# Patient Record
Sex: MALE | Race: WHITE | NOT HISPANIC OR LATINO | Employment: OTHER | URBAN - METROPOLITAN AREA
[De-identification: names, ages, dates, MRNs, and addresses within clinical notes are randomized per-mention and may not be internally consistent; named-entity substitution may affect disease eponyms.]

---

## 2020-05-19 ENCOUNTER — PATIENT OUTREACH (OUTPATIENT)
Dept: SCHEDULING | Facility: IMAGING CENTER | Age: 80
End: 2020-05-19

## 2021-06-09 ENCOUNTER — PATIENT OUTREACH (OUTPATIENT)
Dept: HEALTH INFORMATION MANAGEMENT | Facility: OTHER | Age: 81
End: 2021-06-09

## 2022-07-06 ENCOUNTER — TELEPHONE (OUTPATIENT)
Dept: HEALTH INFORMATION MANAGEMENT | Facility: OTHER | Age: 82
End: 2022-07-06

## 2024-01-18 ENCOUNTER — APPOINTMENT (OUTPATIENT)
Dept: RADIOLOGY | Facility: MEDICAL CENTER | Age: 84
DRG: 482 | End: 2024-01-18
Attending: EMERGENCY MEDICINE
Payer: MEDICARE

## 2024-01-18 ENCOUNTER — HOSPITAL ENCOUNTER (INPATIENT)
Facility: MEDICAL CENTER | Age: 84
LOS: 2 days | DRG: 482 | End: 2024-01-20
Attending: EMERGENCY MEDICINE | Admitting: HOSPITALIST
Payer: MEDICARE

## 2024-01-18 DIAGNOSIS — S72.002A CLOSED FRACTURE OF NECK OF LEFT FEMUR, INITIAL ENCOUNTER (HCC): ICD-10-CM

## 2024-01-18 DIAGNOSIS — R03.0 ELEVATED BLOOD PRESSURE READING: ICD-10-CM

## 2024-01-18 PROBLEM — N40.0 PROSTATIC HYPERPLASIA: Status: ACTIVE | Noted: 2024-01-18

## 2024-01-18 PROBLEM — T14.8XXA FRACTURE: Status: ACTIVE | Noted: 2024-01-18

## 2024-01-18 LAB
ANION GAP SERPL CALC-SCNC: 12 MMOL/L (ref 7–16)
APTT PPP: 29.1 SEC (ref 24.7–36)
BASOPHILS # BLD AUTO: 0.5 % (ref 0–1.8)
BASOPHILS # BLD: 0.05 K/UL (ref 0–0.12)
BUN SERPL-MCNC: 24 MG/DL (ref 8–22)
CALCIUM SERPL-MCNC: 10.2 MG/DL (ref 8.4–10.2)
CHLORIDE SERPL-SCNC: 104 MMOL/L (ref 96–112)
CO2 SERPL-SCNC: 24 MMOL/L (ref 20–33)
CREAT SERPL-MCNC: 1 MG/DL (ref 0.5–1.4)
EOSINOPHIL # BLD AUTO: 0.22 K/UL (ref 0–0.51)
EOSINOPHIL NFR BLD: 2 % (ref 0–6.9)
ERYTHROCYTE [DISTWIDTH] IN BLOOD BY AUTOMATED COUNT: 42.5 FL (ref 35.9–50)
GFR SERPLBLD CREATININE-BSD FMLA CKD-EPI: 75 ML/MIN/1.73 M 2
GLUCOSE SERPL-MCNC: 101 MG/DL (ref 65–99)
HCT VFR BLD AUTO: 42.2 % (ref 42–52)
HGB BLD-MCNC: 14.4 G/DL (ref 14–18)
IMM GRANULOCYTES # BLD AUTO: 0.05 K/UL (ref 0–0.11)
IMM GRANULOCYTES NFR BLD AUTO: 0.5 % (ref 0–0.9)
INR PPP: 0.98 (ref 0.87–1.13)
LYMPHOCYTES # BLD AUTO: 1.52 K/UL (ref 1–4.8)
LYMPHOCYTES NFR BLD: 14 % (ref 22–41)
MCH RBC QN AUTO: 30.3 PG (ref 27–33)
MCHC RBC AUTO-ENTMCNC: 34.1 G/DL (ref 32.3–36.5)
MCV RBC AUTO: 88.7 FL (ref 81.4–97.8)
MONOCYTES # BLD AUTO: 0.86 K/UL (ref 0–0.85)
MONOCYTES NFR BLD AUTO: 7.9 % (ref 0–13.4)
NEUTROPHILS # BLD AUTO: 8.13 K/UL (ref 1.82–7.42)
NEUTROPHILS NFR BLD: 75.1 % (ref 44–72)
NRBC # BLD AUTO: 0 K/UL
NRBC BLD-RTO: 0 /100 WBC (ref 0–0.2)
PLATELET # BLD AUTO: 247 K/UL (ref 164–446)
PMV BLD AUTO: 11.4 FL (ref 9–12.9)
POTASSIUM SERPL-SCNC: 3.7 MMOL/L (ref 3.6–5.5)
PROTHROMBIN TIME: 13.4 SEC (ref 12–14.6)
RBC # BLD AUTO: 4.76 M/UL (ref 4.7–6.1)
SODIUM SERPL-SCNC: 140 MMOL/L (ref 135–145)
WBC # BLD AUTO: 10.8 K/UL (ref 4.8–10.8)

## 2024-01-18 PROCEDURE — 71045 X-RAY EXAM CHEST 1 VIEW: CPT

## 2024-01-18 PROCEDURE — 36415 COLL VENOUS BLD VENIPUNCTURE: CPT

## 2024-01-18 PROCEDURE — 99222 1ST HOSP IP/OBS MODERATE 55: CPT | Mod: AI | Performed by: HOSPITALIST

## 2024-01-18 PROCEDURE — 700111 HCHG RX REV CODE 636 W/ 250 OVERRIDE (IP): Mod: JZ | Performed by: EMERGENCY MEDICINE

## 2024-01-18 PROCEDURE — 94760 N-INVAS EAR/PLS OXIMETRY 1: CPT

## 2024-01-18 PROCEDURE — 96375 TX/PRO/DX INJ NEW DRUG ADDON: CPT

## 2024-01-18 PROCEDURE — 99285 EMERGENCY DEPT VISIT HI MDM: CPT

## 2024-01-18 PROCEDURE — 85610 PROTHROMBIN TIME: CPT

## 2024-01-18 PROCEDURE — 80048 BASIC METABOLIC PNL TOTAL CA: CPT

## 2024-01-18 PROCEDURE — 96374 THER/PROPH/DIAG INJ IV PUSH: CPT

## 2024-01-18 PROCEDURE — 770006 HCHG ROOM/CARE - MED/SURG/GYN SEMI*

## 2024-01-18 PROCEDURE — 700105 HCHG RX REV CODE 258: Performed by: HOSPITALIST

## 2024-01-18 PROCEDURE — 85025 COMPLETE CBC W/AUTO DIFF WBC: CPT

## 2024-01-18 PROCEDURE — 85730 THROMBOPLASTIN TIME PARTIAL: CPT

## 2024-01-18 RX ORDER — ONDANSETRON 2 MG/ML
4 INJECTION INTRAMUSCULAR; INTRAVENOUS EVERY 4 HOURS PRN
Status: DISCONTINUED | OUTPATIENT
Start: 2024-01-18 | End: 2024-01-20 | Stop reason: HOSPADM

## 2024-01-18 RX ORDER — AMOXICILLIN 250 MG
2 CAPSULE ORAL 2 TIMES DAILY
Status: DISCONTINUED | OUTPATIENT
Start: 2024-01-18 | End: 2024-01-20 | Stop reason: HOSPADM

## 2024-01-18 RX ORDER — LABETALOL HYDROCHLORIDE 5 MG/ML
10 INJECTION, SOLUTION INTRAVENOUS EVERY 4 HOURS PRN
Status: DISCONTINUED | OUTPATIENT
Start: 2024-01-18 | End: 2024-01-20 | Stop reason: HOSPADM

## 2024-01-18 RX ORDER — POLYETHYLENE GLYCOL 3350 17 G/17G
1 POWDER, FOR SOLUTION ORAL
Status: DISCONTINUED | OUTPATIENT
Start: 2024-01-18 | End: 2024-01-20 | Stop reason: HOSPADM

## 2024-01-18 RX ORDER — FINASTERIDE 5 MG/1
5 TABLET, FILM COATED ORAL DAILY
Status: DISCONTINUED | OUTPATIENT
Start: 2024-01-19 | End: 2024-01-20 | Stop reason: HOSPADM

## 2024-01-18 RX ORDER — BISACODYL 10 MG
10 SUPPOSITORY, RECTAL RECTAL
Status: DISCONTINUED | OUTPATIENT
Start: 2024-01-18 | End: 2024-01-20 | Stop reason: HOSPADM

## 2024-01-18 RX ORDER — HYDROMORPHONE HYDROCHLORIDE 1 MG/ML
0.25 INJECTION, SOLUTION INTRAMUSCULAR; INTRAVENOUS; SUBCUTANEOUS
Status: DISCONTINUED | OUTPATIENT
Start: 2024-01-18 | End: 2024-01-20 | Stop reason: HOSPADM

## 2024-01-18 RX ORDER — OXYCODONE HYDROCHLORIDE 5 MG/1
2.5 TABLET ORAL
Status: DISCONTINUED | OUTPATIENT
Start: 2024-01-18 | End: 2024-01-20 | Stop reason: HOSPADM

## 2024-01-18 RX ORDER — MORPHINE SULFATE 4 MG/ML
4 INJECTION INTRAVENOUS ONCE
Status: COMPLETED | OUTPATIENT
Start: 2024-01-18 | End: 2024-01-18

## 2024-01-18 RX ORDER — SODIUM CHLORIDE, SODIUM LACTATE, POTASSIUM CHLORIDE, CALCIUM CHLORIDE 600; 310; 30; 20 MG/100ML; MG/100ML; MG/100ML; MG/100ML
INJECTION, SOLUTION INTRAVENOUS CONTINUOUS
Status: DISCONTINUED | OUTPATIENT
Start: 2024-01-19 | End: 2024-01-20

## 2024-01-18 RX ORDER — OXYCODONE HYDROCHLORIDE 5 MG/1
5 TABLET ORAL
Status: DISCONTINUED | OUTPATIENT
Start: 2024-01-18 | End: 2024-01-20 | Stop reason: HOSPADM

## 2024-01-18 RX ORDER — ACETAMINOPHEN 325 MG/1
650 TABLET ORAL EVERY 6 HOURS PRN
Status: DISCONTINUED | OUTPATIENT
Start: 2024-01-18 | End: 2024-01-20 | Stop reason: HOSPADM

## 2024-01-18 RX ORDER — ONDANSETRON 4 MG/1
4 TABLET, ORALLY DISINTEGRATING ORAL EVERY 4 HOURS PRN
Status: DISCONTINUED | OUTPATIENT
Start: 2024-01-18 | End: 2024-01-20 | Stop reason: HOSPADM

## 2024-01-18 RX ORDER — ONDANSETRON 2 MG/ML
4 INJECTION INTRAMUSCULAR; INTRAVENOUS ONCE
Status: COMPLETED | OUTPATIENT
Start: 2024-01-18 | End: 2024-01-18

## 2024-01-18 RX ADMIN — SODIUM CHLORIDE, POTASSIUM CHLORIDE, SODIUM LACTATE AND CALCIUM CHLORIDE: 600; 310; 30; 20 INJECTION, SOLUTION INTRAVENOUS at 23:39

## 2024-01-18 RX ADMIN — ONDANSETRON 4 MG: 2 INJECTION INTRAMUSCULAR; INTRAVENOUS at 17:55

## 2024-01-18 RX ADMIN — MORPHINE SULFATE 4 MG: 4 INJECTION, SOLUTION INTRAMUSCULAR; INTRAVENOUS at 17:55

## 2024-01-18 ASSESSMENT — PATIENT HEALTH QUESTIONNAIRE - PHQ9
SUM OF ALL RESPONSES TO PHQ9 QUESTIONS 1 AND 2: 0
1. LITTLE INTEREST OR PLEASURE IN DOING THINGS: NOT AT ALL
2. FEELING DOWN, DEPRESSED, IRRITABLE, OR HOPELESS: NOT AT ALL

## 2024-01-18 ASSESSMENT — LIFESTYLE VARIABLES
ALCOHOL_USE: YES
TOTAL SCORE: 0
HAVE PEOPLE ANNOYED YOU BY CRITICIZING YOUR DRINKING: NO
HAVE YOU EVER FELT YOU SHOULD CUT DOWN ON YOUR DRINKING: NO
TOTAL SCORE: 0
AVERAGE NUMBER OF DAYS PER WEEK YOU HAVE A DRINK CONTAINING ALCOHOL: 3
CONSUMPTION TOTAL: INCOMPLETE
TOTAL SCORE: 0
EVER FELT BAD OR GUILTY ABOUT YOUR DRINKING: NO
EVER HAD A DRINK FIRST THING IN THE MORNING TO STEADY YOUR NERVES TO GET RID OF A HANGOVER: NO
HOW MANY TIMES IN THE PAST YEAR HAVE YOU HAD 5 OR MORE DRINKS IN A DAY: 0

## 2024-01-18 ASSESSMENT — COGNITIVE AND FUNCTIONAL STATUS - GENERAL
MOBILITY SCORE: 17
SUGGESTED CMS G CODE MODIFIER MOBILITY: CK
HELP NEEDED FOR BATHING: A LITTLE
WALKING IN HOSPITAL ROOM: A LITTLE
DRESSING REGULAR LOWER BODY CLOTHING: A LITTLE
SUGGESTED CMS G CODE MODIFIER DAILY ACTIVITY: CJ
DAILY ACTIVITIY SCORE: 21
DRESSING REGULAR UPPER BODY CLOTHING: A LITTLE
CLIMB 3 TO 5 STEPS WITH RAILING: A LOT
STANDING UP FROM CHAIR USING ARMS: A LITTLE
TURNING FROM BACK TO SIDE WHILE IN FLAT BAD: A LITTLE
MOVING FROM LYING ON BACK TO SITTING ON SIDE OF FLAT BED: A LITTLE
MOVING TO AND FROM BED TO CHAIR: A LITTLE

## 2024-01-18 ASSESSMENT — ENCOUNTER SYMPTOMS
STRIDOR: 0
COUGH: 0
CHILLS: 0
BRUISES/BLEEDS EASILY: 0
SHORTNESS OF BREATH: 0
EYE REDNESS: 0
EYE DISCHARGE: 0
ABDOMINAL PAIN: 0
FLANK PAIN: 0
NERVOUS/ANXIOUS: 0
FALLS: 1
FEVER: 0
MYALGIAS: 0
FOCAL WEAKNESS: 0
VOMITING: 0

## 2024-01-18 ASSESSMENT — FIBROSIS 4 INDEX
FIB4 SCORE: 1.76
FIB4 SCORE: 1.43

## 2024-01-18 ASSESSMENT — PAIN DESCRIPTION - PAIN TYPE: TYPE: ACUTE PAIN

## 2024-01-18 ASSESSMENT — PAIN DESCRIPTION - DESCRIPTORS: DESCRIPTORS: SHARP

## 2024-01-19 ENCOUNTER — ANESTHESIA (OUTPATIENT)
Dept: SURGERY | Facility: MEDICAL CENTER | Age: 84
DRG: 482 | End: 2024-01-19
Payer: MEDICARE

## 2024-01-19 ENCOUNTER — ANESTHESIA EVENT (OUTPATIENT)
Dept: SURGERY | Facility: MEDICAL CENTER | Age: 84
DRG: 482 | End: 2024-01-19
Payer: MEDICARE

## 2024-01-19 ENCOUNTER — APPOINTMENT (OUTPATIENT)
Dept: RADIOLOGY | Facility: MEDICAL CENTER | Age: 84
DRG: 482 | End: 2024-01-19
Attending: ORTHOPAEDIC SURGERY
Payer: MEDICARE

## 2024-01-19 PROBLEM — E87.6 HYPOKALEMIA: Status: ACTIVE | Noted: 2024-01-19

## 2024-01-19 LAB
ALBUMIN SERPL BCP-MCNC: 3.3 G/DL (ref 3.2–4.9)
ALBUMIN/GLOB SERPL: 1.4 G/DL
ALP SERPL-CCNC: 147 U/L (ref 30–99)
ALT SERPL-CCNC: 13 U/L (ref 2–50)
ANION GAP SERPL CALC-SCNC: 11 MMOL/L (ref 7–16)
AST SERPL-CCNC: 13 U/L (ref 12–45)
BILIRUB SERPL-MCNC: 0.2 MG/DL (ref 0.1–1.5)
BUN SERPL-MCNC: 23 MG/DL (ref 8–22)
CALCIUM ALBUM COR SERPL-MCNC: 10.2 MG/DL (ref 8.5–10.5)
CALCIUM SERPL-MCNC: 9.6 MG/DL (ref 8.4–10.2)
CHLORIDE SERPL-SCNC: 106 MMOL/L (ref 96–112)
CO2 SERPL-SCNC: 23 MMOL/L (ref 20–33)
CREAT SERPL-MCNC: 0.96 MG/DL (ref 0.5–1.4)
ERYTHROCYTE [DISTWIDTH] IN BLOOD BY AUTOMATED COUNT: 43.7 FL (ref 35.9–50)
GFR SERPLBLD CREATININE-BSD FMLA CKD-EPI: 79 ML/MIN/1.73 M 2
GLOBULIN SER CALC-MCNC: 2.4 G/DL (ref 1.9–3.5)
GLUCOSE SERPL-MCNC: 105 MG/DL (ref 65–99)
HCT VFR BLD AUTO: 40.3 % (ref 42–52)
HGB BLD-MCNC: 13.7 G/DL (ref 14–18)
MAGNESIUM SERPL-MCNC: 2.2 MG/DL (ref 1.5–2.5)
MCH RBC QN AUTO: 30.6 PG (ref 27–33)
MCHC RBC AUTO-ENTMCNC: 34 G/DL (ref 32.3–36.5)
MCV RBC AUTO: 90.2 FL (ref 81.4–97.8)
PLATELET # BLD AUTO: 231 K/UL (ref 164–446)
PMV BLD AUTO: 11.8 FL (ref 9–12.9)
POTASSIUM SERPL-SCNC: 3.5 MMOL/L (ref 3.6–5.5)
PROT SERPL-MCNC: 5.7 G/DL (ref 6–8.2)
RBC # BLD AUTO: 4.47 M/UL (ref 4.7–6.1)
SODIUM SERPL-SCNC: 140 MMOL/L (ref 135–145)
WBC # BLD AUTO: 8.3 K/UL (ref 4.8–10.8)

## 2024-01-19 PROCEDURE — 700111 HCHG RX REV CODE 636 W/ 250 OVERRIDE (IP): Performed by: STUDENT IN AN ORGANIZED HEALTH CARE EDUCATION/TRAINING PROGRAM

## 2024-01-19 PROCEDURE — 502000 HCHG MISC OR IMPLANTS RC 0278: Performed by: ORTHOPAEDIC SURGERY

## 2024-01-19 PROCEDURE — 700105 HCHG RX REV CODE 258: Performed by: HOSPITALIST

## 2024-01-19 PROCEDURE — 160009 HCHG ANES TIME/MIN: Performed by: ORTHOPAEDIC SURGERY

## 2024-01-19 PROCEDURE — 80053 COMPREHEN METABOLIC PANEL: CPT

## 2024-01-19 PROCEDURE — 73502 X-RAY EXAM HIP UNI 2-3 VIEWS: CPT | Mod: LT

## 2024-01-19 PROCEDURE — 700102 HCHG RX REV CODE 250 W/ 637 OVERRIDE(OP): Performed by: HOSPITALIST

## 2024-01-19 PROCEDURE — 99222 1ST HOSP IP/OBS MODERATE 55: CPT | Mod: 57 | Performed by: ORTHOPAEDIC SURGERY

## 2024-01-19 PROCEDURE — 83735 ASSAY OF MAGNESIUM: CPT

## 2024-01-19 PROCEDURE — 27236 TREAT THIGH FRACTURE: CPT | Mod: LT | Performed by: ORTHOPAEDIC SURGERY

## 2024-01-19 PROCEDURE — A9270 NON-COVERED ITEM OR SERVICE: HCPCS | Performed by: HOSPITALIST

## 2024-01-19 PROCEDURE — 700102 HCHG RX REV CODE 250 W/ 637 OVERRIDE(OP): Performed by: STUDENT IN AN ORGANIZED HEALTH CARE EDUCATION/TRAINING PROGRAM

## 2024-01-19 PROCEDURE — C1713 ANCHOR/SCREW BN/BN,TIS/BN: HCPCS | Performed by: ORTHOPAEDIC SURGERY

## 2024-01-19 PROCEDURE — 160041 HCHG SURGERY MINUTES - EA ADDL 1 MIN LEVEL 4: Performed by: ORTHOPAEDIC SURGERY

## 2024-01-19 PROCEDURE — 36415 COLL VENOUS BLD VENIPUNCTURE: CPT

## 2024-01-19 PROCEDURE — 770001 HCHG ROOM/CARE - MED/SURG/GYN PRIV*

## 2024-01-19 PROCEDURE — 94760 N-INVAS EAR/PLS OXIMETRY 1: CPT

## 2024-01-19 PROCEDURE — 85027 COMPLETE CBC AUTOMATED: CPT

## 2024-01-19 PROCEDURE — 700101 HCHG RX REV CODE 250: Performed by: STUDENT IN AN ORGANIZED HEALTH CARE EDUCATION/TRAINING PROGRAM

## 2024-01-19 PROCEDURE — 0QS704Z REPOSITION LEFT UPPER FEMUR WITH INTERNAL FIXATION DEVICE, OPEN APPROACH: ICD-10-PCS | Performed by: ORTHOPAEDIC SURGERY

## 2024-01-19 PROCEDURE — 700111 HCHG RX REV CODE 636 W/ 250 OVERRIDE (IP): Mod: JZ | Performed by: HOSPITALIST

## 2024-01-19 PROCEDURE — 160036 HCHG PACU - EA ADDL 30 MINS PHASE I: Performed by: ORTHOPAEDIC SURGERY

## 2024-01-19 PROCEDURE — 160035 HCHG PACU - 1ST 60 MINS PHASE I: Performed by: ORTHOPAEDIC SURGERY

## 2024-01-19 PROCEDURE — 700111 HCHG RX REV CODE 636 W/ 250 OVERRIDE (IP): Performed by: ORTHOPAEDIC SURGERY

## 2024-01-19 PROCEDURE — 160002 HCHG RECOVERY MINUTES (STAT): Performed by: ORTHOPAEDIC SURGERY

## 2024-01-19 PROCEDURE — 700105 HCHG RX REV CODE 258: Performed by: ORTHOPAEDIC SURGERY

## 2024-01-19 PROCEDURE — 160029 HCHG SURGERY MINUTES - 1ST 30 MINS LEVEL 4: Performed by: ORTHOPAEDIC SURGERY

## 2024-01-19 PROCEDURE — 160048 HCHG OR STATISTICAL LEVEL 1-5: Performed by: ORTHOPAEDIC SURGERY

## 2024-01-19 PROCEDURE — 99232 SBSQ HOSP IP/OBS MODERATE 35: CPT | Performed by: HOSPITALIST

## 2024-01-19 PROCEDURE — A9270 NON-COVERED ITEM OR SERVICE: HCPCS | Performed by: STUDENT IN AN ORGANIZED HEALTH CARE EDUCATION/TRAINING PROGRAM

## 2024-01-19 DEVICE — IMPLANTABLE DEVICE: Type: IMPLANTABLE DEVICE | Site: HIP | Status: FUNCTIONAL

## 2024-01-19 RX ORDER — ROCURONIUM BROMIDE 10 MG/ML
INJECTION, SOLUTION INTRAVENOUS PRN
Status: DISCONTINUED | OUTPATIENT
Start: 2024-01-19 | End: 2024-01-19 | Stop reason: SURG

## 2024-01-19 RX ORDER — ENOXAPARIN SODIUM 100 MG/ML
40 INJECTION SUBCUTANEOUS DAILY
Status: DISCONTINUED | OUTPATIENT
Start: 2024-01-19 | End: 2024-01-20 | Stop reason: HOSPADM

## 2024-01-19 RX ORDER — HYDROMORPHONE HYDROCHLORIDE 1 MG/ML
0.4 INJECTION, SOLUTION INTRAMUSCULAR; INTRAVENOUS; SUBCUTANEOUS
Status: DISCONTINUED | OUTPATIENT
Start: 2024-01-19 | End: 2024-01-19 | Stop reason: HOSPADM

## 2024-01-19 RX ORDER — DEXAMETHASONE SODIUM PHOSPHATE 4 MG/ML
INJECTION, SOLUTION INTRA-ARTICULAR; INTRALESIONAL; INTRAMUSCULAR; INTRAVENOUS; SOFT TISSUE PRN
Status: DISCONTINUED | OUTPATIENT
Start: 2024-01-19 | End: 2024-01-19 | Stop reason: SURG

## 2024-01-19 RX ORDER — DIPHENHYDRAMINE HYDROCHLORIDE 50 MG/ML
12.5 INJECTION INTRAMUSCULAR; INTRAVENOUS
Status: DISCONTINUED | OUTPATIENT
Start: 2024-01-19 | End: 2024-01-19 | Stop reason: HOSPADM

## 2024-01-19 RX ORDER — LIDOCAINE HYDROCHLORIDE 20 MG/ML
INJECTION, SOLUTION EPIDURAL; INFILTRATION; INTRACAUDAL; PERINEURAL PRN
Status: DISCONTINUED | OUTPATIENT
Start: 2024-01-19 | End: 2024-01-19 | Stop reason: SURG

## 2024-01-19 RX ORDER — HYDROMORPHONE HYDROCHLORIDE 1 MG/ML
0.1 INJECTION, SOLUTION INTRAMUSCULAR; INTRAVENOUS; SUBCUTANEOUS
Status: DISCONTINUED | OUTPATIENT
Start: 2024-01-19 | End: 2024-01-19 | Stop reason: HOSPADM

## 2024-01-19 RX ORDER — OXYCODONE HCL 5 MG/5 ML
10 SOLUTION, ORAL ORAL
Status: COMPLETED | OUTPATIENT
Start: 2024-01-19 | End: 2024-01-19

## 2024-01-19 RX ORDER — ONDANSETRON 2 MG/ML
4 INJECTION INTRAMUSCULAR; INTRAVENOUS
Status: COMPLETED | OUTPATIENT
Start: 2024-01-19 | End: 2024-01-19

## 2024-01-19 RX ORDER — HYDROMORPHONE HYDROCHLORIDE 2 MG/ML
INJECTION, SOLUTION INTRAMUSCULAR; INTRAVENOUS; SUBCUTANEOUS PRN
Status: DISCONTINUED | OUTPATIENT
Start: 2024-01-19 | End: 2024-01-19 | Stop reason: SURG

## 2024-01-19 RX ORDER — SODIUM CHLORIDE, SODIUM LACTATE, POTASSIUM CHLORIDE, CALCIUM CHLORIDE 600; 310; 30; 20 MG/100ML; MG/100ML; MG/100ML; MG/100ML
INJECTION, SOLUTION INTRAVENOUS CONTINUOUS
Status: DISCONTINUED | OUTPATIENT
Start: 2024-01-19 | End: 2024-01-19 | Stop reason: HOSPADM

## 2024-01-19 RX ORDER — TRANEXAMIC ACID 100 MG/ML
INJECTION, SOLUTION INTRAVENOUS PRN
Status: DISCONTINUED | OUTPATIENT
Start: 2024-01-19 | End: 2024-01-19 | Stop reason: SURG

## 2024-01-19 RX ORDER — MEPERIDINE HYDROCHLORIDE 25 MG/ML
12.5 INJECTION INTRAMUSCULAR; INTRAVENOUS; SUBCUTANEOUS
Status: DISCONTINUED | OUTPATIENT
Start: 2024-01-19 | End: 2024-01-19 | Stop reason: HOSPADM

## 2024-01-19 RX ORDER — CEFAZOLIN SODIUM 1 G/3ML
INJECTION, POWDER, FOR SOLUTION INTRAMUSCULAR; INTRAVENOUS PRN
Status: DISCONTINUED | OUTPATIENT
Start: 2024-01-19 | End: 2024-01-19 | Stop reason: SURG

## 2024-01-19 RX ORDER — HYDRALAZINE HYDROCHLORIDE 20 MG/ML
5 INJECTION INTRAMUSCULAR; INTRAVENOUS
Status: DISCONTINUED | OUTPATIENT
Start: 2024-01-19 | End: 2024-01-19 | Stop reason: HOSPADM

## 2024-01-19 RX ORDER — HALOPERIDOL 5 MG/ML
1 INJECTION INTRAMUSCULAR
Status: DISCONTINUED | OUTPATIENT
Start: 2024-01-19 | End: 2024-01-19 | Stop reason: HOSPADM

## 2024-01-19 RX ORDER — HYDROMORPHONE HYDROCHLORIDE 1 MG/ML
0.2 INJECTION, SOLUTION INTRAMUSCULAR; INTRAVENOUS; SUBCUTANEOUS
Status: DISCONTINUED | OUTPATIENT
Start: 2024-01-19 | End: 2024-01-19 | Stop reason: HOSPADM

## 2024-01-19 RX ORDER — LABETALOL HYDROCHLORIDE 5 MG/ML
5 INJECTION, SOLUTION INTRAVENOUS
Status: DISCONTINUED | OUTPATIENT
Start: 2024-01-19 | End: 2024-01-19 | Stop reason: HOSPADM

## 2024-01-19 RX ORDER — OXYCODONE HCL 5 MG/5 ML
5 SOLUTION, ORAL ORAL
Status: COMPLETED | OUTPATIENT
Start: 2024-01-19 | End: 2024-01-19

## 2024-01-19 RX ADMIN — CEFAZOLIN 2 G: 2 INJECTION, POWDER, FOR SOLUTION INTRAMUSCULAR; INTRAVENOUS at 18:09

## 2024-01-19 RX ADMIN — FENTANYL CITRATE 25 MCG: 50 INJECTION, SOLUTION INTRAMUSCULAR; INTRAVENOUS at 11:30

## 2024-01-19 RX ADMIN — ENOXAPARIN SODIUM 40 MG: 100 INJECTION SUBCUTANEOUS at 18:08

## 2024-01-19 RX ADMIN — FENTANYL CITRATE 25 MCG: 50 INJECTION, SOLUTION INTRAMUSCULAR; INTRAVENOUS at 11:58

## 2024-01-19 RX ADMIN — CEFAZOLIN 2 G: 1 INJECTION, POWDER, FOR SOLUTION INTRAMUSCULAR; INTRAVENOUS at 09:54

## 2024-01-19 RX ADMIN — TRANEXAMIC ACID 1000 MG: 100 INJECTION, SOLUTION INTRAVENOUS at 09:58

## 2024-01-19 RX ADMIN — HYDRALAZINE HYDROCHLORIDE 5 MG: 20 INJECTION, SOLUTION INTRAMUSCULAR; INTRAVENOUS at 12:51

## 2024-01-19 RX ADMIN — PROPOFOL 150 MG: 10 INJECTION, EMULSION INTRAVENOUS at 09:52

## 2024-01-19 RX ADMIN — OXYCODONE HYDROCHLORIDE 5 MG: 5 TABLET ORAL at 21:08

## 2024-01-19 RX ADMIN — SODIUM CHLORIDE, POTASSIUM CHLORIDE, SODIUM LACTATE AND CALCIUM CHLORIDE: 600; 310; 30; 20 INJECTION, SOLUTION INTRAVENOUS at 18:09

## 2024-01-19 RX ADMIN — FENTANYL CITRATE 25 MCG: 50 INJECTION, SOLUTION INTRAMUSCULAR; INTRAVENOUS at 11:18

## 2024-01-19 RX ADMIN — ONDANSETRON 4 MG: 2 INJECTION INTRAMUSCULAR; INTRAVENOUS at 11:10

## 2024-01-19 RX ADMIN — CEFAZOLIN 2 G: 1 INJECTION, POWDER, FOR SOLUTION INTRAMUSCULAR; INTRAVENOUS at 10:42

## 2024-01-19 RX ADMIN — FENTANYL CITRATE 25 MCG: 50 INJECTION, SOLUTION INTRAMUSCULAR; INTRAVENOUS at 11:40

## 2024-01-19 RX ADMIN — OXYCODONE HYDROCHLORIDE 5 MG: 5 SOLUTION ORAL at 12:00

## 2024-01-19 RX ADMIN — DEXAMETHASONE SODIUM PHOSPHATE 4 MG: 4 INJECTION INTRA-ARTICULAR; INTRALESIONAL; INTRAMUSCULAR; INTRAVENOUS; SOFT TISSUE at 09:58

## 2024-01-19 RX ADMIN — FENTANYL CITRATE 50 MCG: 50 INJECTION, SOLUTION INTRAMUSCULAR; INTRAVENOUS at 12:12

## 2024-01-19 RX ADMIN — HYDROMORPHONE HYDROCHLORIDE 0.5 MG: 2 INJECTION INTRAMUSCULAR; INTRAVENOUS; SUBCUTANEOUS at 10:11

## 2024-01-19 RX ADMIN — ROCURONIUM BROMIDE 30 MG: 50 INJECTION, SOLUTION INTRAVENOUS at 09:56

## 2024-01-19 RX ADMIN — LIDOCAINE HYDROCHLORIDE 100 MG: 20 INJECTION, SOLUTION EPIDURAL; INFILTRATION; INTRACAUDAL at 09:52

## 2024-01-19 RX ADMIN — HYDROMORPHONE HYDROCHLORIDE 0.5 MG: 2 INJECTION INTRAMUSCULAR; INTRAVENOUS; SUBCUTANEOUS at 10:40

## 2024-01-19 RX ADMIN — LABETALOL HYDROCHLORIDE 5 MG: 5 INJECTION, SOLUTION INTRAVENOUS at 11:56

## 2024-01-19 RX ADMIN — HYDROMORPHONE HYDROCHLORIDE 0.5 MG: 2 INJECTION INTRAMUSCULAR; INTRAVENOUS; SUBCUTANEOUS at 09:52

## 2024-01-19 RX ADMIN — OXYCODONE HYDROCHLORIDE 5 MG: 5 SOLUTION ORAL at 11:18

## 2024-01-19 RX ADMIN — ROCURONIUM BROMIDE 20 MG: 50 INJECTION, SOLUTION INTRAVENOUS at 10:11

## 2024-01-19 ASSESSMENT — COGNITIVE AND FUNCTIONAL STATUS - GENERAL
DRESSING REGULAR LOWER BODY CLOTHING: A LOT
MOVING TO AND FROM BED TO CHAIR: A LOT
WALKING IN HOSPITAL ROOM: A LOT
MOVING FROM LYING ON BACK TO SITTING ON SIDE OF FLAT BED: A LITTLE
TURNING FROM BACK TO SIDE WHILE IN FLAT BAD: A LITTLE
DAILY ACTIVITIY SCORE: 20
SUGGESTED CMS G CODE MODIFIER MOBILITY: CL
SUGGESTED CMS G CODE MODIFIER DAILY ACTIVITY: CJ
STANDING UP FROM CHAIR USING ARMS: A LOT
HELP NEEDED FOR BATHING: A LOT
CLIMB 3 TO 5 STEPS WITH RAILING: A LOT
MOBILITY SCORE: 14

## 2024-01-19 ASSESSMENT — LIFESTYLE VARIABLES
TOTAL SCORE: 0
EVER FELT BAD OR GUILTY ABOUT YOUR DRINKING: NO
EVER HAD A DRINK FIRST THING IN THE MORNING TO STEADY YOUR NERVES TO GET RID OF A HANGOVER: NO
AVERAGE NUMBER OF DAYS PER WEEK YOU HAVE A DRINK CONTAINING ALCOHOL: 3
ON A TYPICAL DAY WHEN YOU DRINK ALCOHOL HOW MANY DRINKS DO YOU HAVE: 0
HOW MANY TIMES IN THE PAST YEAR HAVE YOU HAD 5 OR MORE DRINKS IN A DAY: 0
TOTAL SCORE: 0
ALCOHOL_USE: YES
TOTAL SCORE: 0
HAVE PEOPLE ANNOYED YOU BY CRITICIZING YOUR DRINKING: NO
HAVE YOU EVER FELT YOU SHOULD CUT DOWN ON YOUR DRINKING: NO
CONSUMPTION TOTAL: NEGATIVE

## 2024-01-19 ASSESSMENT — PAIN DESCRIPTION - PAIN TYPE
TYPE: ACUTE PAIN
TYPE: CHRONIC PAIN
TYPE: SURGICAL PAIN
TYPE: ACUTE PAIN
TYPE: SURGICAL PAIN

## 2024-01-19 ASSESSMENT — ENCOUNTER SYMPTOMS
SPUTUM PRODUCTION: 0
HEMOPTYSIS: 0
DIZZINESS: 0
COUGH: 0
ORTHOPNEA: 0
CHILLS: 0
VOMITING: 0
NAUSEA: 0
PALPITATIONS: 0

## 2024-01-19 NOTE — CARE PLAN
The patient is Stable - Low risk of patient condition declining or worsening    Shift Goals  Clinical Goals: surgery  Patient Goals: surgery  Family Goals: support    Progress made toward(s) clinical / shift goals:  Patient had hip surgery. Aquacel dressing intact. Pain managed adequately. Pending PT/OT.   Problem: Pain - Standard  Goal: Alleviation of pain or a reduction in pain to the patient’s comfort goal  Outcome: Progressing     Problem: Risk for Bleeding  Goal: Patient will take measures to prevent bleeding and recognizes signs of bleeding that need to be reported immediately to a health care professional  Outcome: Progressing       Patient is not progressing towards the following goals:

## 2024-01-19 NOTE — ANESTHESIA PROCEDURE NOTES
Airway    Date/Time: 1/19/2024 9:54 AM    Performed by: Kai Bruner M.D.  Authorized by: Kai Bruner M.D.    Location:  OR  Urgency:  Elective  Indications for Airway Management:  Anesthesia      Spontaneous Ventilation: absent    Sedation Level:  Deep  Preoxygenated: Yes    Final Airway Type:  Supraglottic airway  Final Supraglottic Airway:  Standard LMA    SGA Size:  4  Number of Attempts at Approach:  1

## 2024-01-19 NOTE — CONSULTS
1/19/2024    HPI: Real Yadav is a 83 y.o. male who presents with left hip pain after a fall about 3 weeks ago.  He states that he was hiking around and tripped on a curb and had a mechanical fall onto his left side.  Initially went to [facility] where imaging was reportedly negative.  He has had persistent pain and inability to bear weight since then.  He ultimately returned to [facility] urgent care yesterday and MRI was obtained which demonstrated a subacute left femoral neck fracture.  He was then sent [facility] and is for direct admission and plan for surgical fixation of his fracture.  Currently complains only of left hip pain.  Denies any numbness or tingling in his foot.  He states that he has been unable to ambulate and has been getting around with crutches.    Past Medical History:   Diagnosis Date    Gout     H/O measles     Hemorrhoid     History of chickenpox     Venereal disease        Past Surgical History:   Procedure Laterality Date    VASECTOMY  1973    APPENDECTOMY  1961       Medications  No current facility-administered medications on file prior to encounter.     Current Outpatient Medications on File Prior to Encounter   Medication Sig Dispense Refill    finasteride (PROSCAR) 5 MG Tab Take 5 mg by mouth every day.         Allergies  Patient has no known allergies.    ROS  Negative except as indicated in the HPI    Family History   Problem Relation Age of Onset    Cancer Mother     Heart Disease Father     Heart Attack Father     Arthritis Brother     Lung Disease Sister     Arthritis Other     Cancer Other     Other Other         GOUT    Heart Disease Other        Social History     Socioeconomic History    Marital status:     Highest education level: Bachelor's degree (e.g., BA, AB, BS)   Tobacco Use    Smoking status: Former     Current packs/day: 0.00     Average packs/day: 1.5 packs/day for 40.0 years (60.0 ttl pk-yrs)     Types: Cigarettes     Start date: 8/4/1946      "Quit date: 1986     Years since quittin.4    Smokeless tobacco: Never   Substance and Sexual Activity    Alcohol use: Yes     Alcohol/week: 5.4 oz     Types: 7 Glasses of wine, 2 Shots of liquor per week    Drug use: No     Social Determinants of Health     Financial Resource Strain: Low Risk  (2022)    Overall Financial Resource Strain (CARDIA)     Difficulty of Paying Living Expenses: Not hard at all   Food Insecurity: No Food Insecurity (2022)    Hunger Vital Sign     Worried About Running Out of Food in the Last Year: Never true     Ran Out of Food in the Last Year: Never true   Transportation Needs: No Transportation Needs (2022)    PRAPARE - Transportation     Lack of Transportation (Medical): No     Lack of Transportation (Non-Medical): No   Physical Activity: Sufficiently Active (2022)    Exercise Vital Sign     Days of Exercise per Week: 5 days     Minutes of Exercise per Session: 60 min   Stress: No Stress Concern Present (2022)    [employer] - Occupational Stress Questionnaire     Feeling of Stress : Only a little   Social Connections: Moderately Isolated (2022)    Social Connection and Isolation Panel [NHANES]     Frequency of Communication with Friends and Family: More than three times a week     Frequency of Social Gatherings with Friends and Family: Three times a week     Attends Sabianist Services: Never     Active Member of Clubs or Organizations: Yes     Attends Club or Organization Meetings: More than 4 times per year     Marital Status:    Housing Stability: Low Risk  (2022)    Housing Stability Vital Sign     Unable to Pay for Housing in the Last Year: No     Number of Places Lived in the Last Year: 2     Unstable Housing in the Last Year: No       Physical Exam  Vitals  BP (!) 160/83   Pulse (!) 58   Temp 36.1 °C (97 °F) (Temporal)   Resp 16   Ht 1.854 m (6' 1\")   Wt 99.7 kg (219 lb 12.8 oz)   SpO2 96% "   General: NAD  HEENT: Normocephalic, atraumatic  Psych: Normal mood and affect  Neck: No collar in place, normal appearing motion  Lungs: No increased work of breathing  Heart: Regular rate by palpation of peripheral pulse  Abdomen: Nondistended  MSK:   On inspection of the left lower extremity there is no obvious deformity or skin change.  He does have pain with logroll and is unable to straight leg raise on the side.  Sensation tact light touch distally in the foot.  Moves ankle and toes up/down.  Foot is warm and well-perfused.      Radiographs:  X-ray of the left hip from 1/16/2024 independently reviewed by me demonstrates a minimally displaced femoral neck fracture.  There is minimal valgus displacement and no significant apex anterior angulation on the lateral view.    MRI of the left hip from 1/18/2024 also independently reviewed by me demonstrates the above subacute appearing femoral neck fracture.    DX-CHEST-PORTABLE (1 VIEW)   Final Result      1.  There is minimal linear left lower lobe atelectasis.          Laboratory Values  Recent Labs     01/18/24 1752 01/19/24  0110   WBC 10.8 8.3   RBC 4.76 4.47*   HEMOGLOBIN 14.4 13.7*   HEMATOCRIT 42.2 40.3*   MCV 88.7 90.2   MCH 30.3 30.6   MCHC 34.1 34.0   RDW 42.5 43.7   PLATELETCT 247 231   MPV 11.4 11.8     Recent Labs     01/18/24  1752 01/19/24  0110   SODIUM 140 140   POTASSIUM 3.7 3.5*   CHLORIDE 104 106   CO2 24 23   GLUCOSE 101* 105*   BUN 24* 23*     Recent Labs     01/18/24 1752   APTT 29.1   INR 0.98         Assessment: 83-year-old male with a left subacute femoral neck fracture after mechanical fall about 3 weeks ago with persistent inability to ambulate.    Plan: We discussed the diagnosis and findings with the patient at length.  We reviewed possible non operative and operative interventions and the risks and benefits of each of these.  Specifically we discussed failure of fixation of femoral neck fractures in patients of his age group and  this may necessitate arthroplasty in the future.  He had a chance to ask questions and all of these were answered to his satisfaction. The patient chose to proceed with operative intervention to include surgical fixation of the left hip. Risks and benefits of surgery were discussed which include but are not limited to bleeding, infection, neurovascular damage, malunion, nonunion, instability, limb length discrepancy, DVT, PE, MI, Stroke and death. They understand these risks and wish to proceed.      N.p.o. for OR today.      Jaylan Turcios MD  Orthopedic Trauma

## 2024-01-19 NOTE — ASSESSMENT & PLAN NOTE
Due to trauma which occurred approximately 3 weeks prior to admission  Femur fracture identified on MRI  Status post open reduction internal fixation of left femoral neck fracture on 1/19/2024 1/19:   Check CBC a.m.  PT/OT, post acute planning  Lovenox for DVT prophylaxis

## 2024-01-19 NOTE — ED TRIAGE NOTES
"Chief Complaint   Patient presents with    Hip Pain     Pt c/o Lt hip Pn from Fx, stated that he has Sx scheduled tonight, Pt stated that the Pn is too much right now, Pt stated that he tripped & fell 3-wks ago, went head first into the sidewalk; Pt currently in WC; - blood thinners, -N/V/D;        ED Triage Vitals [01/18/24 1706]   Enc Vitals Group      Blood Pressure  (Abnormal) 117/92      Pulse 73      Respiration 18      Temperature 36.6 °C (97.9 °F)      Temp src Temporal      Pulse Oximetry 95 %      Weight 81.6 kg (180 lb)      Height 1.854 m (6' 1\")      Head Circumference       Peak Flow       Pain Score       Pain Loc       Pain Edu?       Excl. in GC?       "

## 2024-01-19 NOTE — HOSPITAL COURSE
Keyon Yadav has past medical history includes gout and BPH.  The patient had a fall approximately 3 weeks prior to this admission.  He had extensive imaging and workup.  Due to ongoing pain and difficulty ambulating he was evaluated with an outpatient MRI which revealed a femoral neck fracture he was referred to the emergency room on 1/18/2024 and admitted for surgical intervention.      On 1/19/2024, he underwent open reduction, internal fixation of left femoral neck fracture.

## 2024-01-19 NOTE — PROGRESS NOTES
[facility] Medicine Daily Progress Note    Date of Service  1/19/2024    Chief Complaint  Real Yadav is a 83 y.o. male admitted 1/18/2024 with hip pain    Hospital Course  Keyon Yadav has past medical history includes gout and BPH.  The patient had a fall approximately 3 weeks prior to this admission.  He had extensive imaging and workup.  Due to ongoing pain and difficulty ambulating he was evaluated with an outpatient MRI which revealed a femoral neck fracture he was referred to the emergency room on 1/18/2024 and admitted for surgical intervention    Interval Problem Update  Patient seen and examined, his wife is at the bedside  Had surgery this morning  His pain is well-controlled at this time  Denies shortness of breath, denies chest pain  We discussed plans for care including PT and OT evaluation and assessment of postacute needs    I have discussed this patient's plan of care and discharge plan at IDT rounds today with Case Management, Nursing, Nursing leadership, and other members of the IDT team.    Consultants/Specialty  orthopedics    Code Status  Full Code    Disposition  The patient is not medically cleared for discharge to home or a post-acute facility.  Anticipate discharge to: home with organized home healthcare and close outpatient follow-up    I have placed the appropriate orders for post-discharge needs.    Review of Systems  Review of Systems   Constitutional:  Negative for chills and malaise/fatigue.   Respiratory:  Negative for cough, hemoptysis and sputum production.    Cardiovascular:  Negative for chest pain, palpitations and orthopnea.   Gastrointestinal:  Negative for nausea and vomiting.   Musculoskeletal:  Positive for joint pain.   Skin:  Negative for itching and rash.   Neurological:  Negative for dizziness.   All other systems reviewed and are negative.       Physical Exam  Temp:  [35.9 °C (96.6 °F)-36.6 °C (97.9 °F)] 36.3 °C (97.4 °F)  Pulse:  [51-75] 58  Resp:  [16-18] 16  BP:  (117-193)/(66-95) 161/81  SpO2:  [85 %-100 %] 97 %    Physical Exam  Constitutional:       General: He is not in acute distress.     Appearance: Normal appearance. He is normal weight.   HENT:      Head: Normocephalic and atraumatic.      Right Ear: External ear normal.      Left Ear: External ear normal.      Nose: Nose normal.      Mouth/Throat:      Mouth: Mucous membranes are moist.      Pharynx: Oropharynx is clear.   Cardiovascular:      Rate and Rhythm: Normal rate and regular rhythm.      Pulses: Normal pulses.   Pulmonary:      Effort: Pulmonary effort is normal.      Breath sounds: Normal breath sounds.   Abdominal:      General: Abdomen is flat. Bowel sounds are normal.      Palpations: Abdomen is soft.   Musculoskeletal:         General: Normal range of motion.      Cervical back: Normal range of motion and neck supple.      Comments: Left lateral thigh incision with dressing which is clean dry and intact   Skin:     General: Skin is warm and dry.      Coloration: Skin is not jaundiced.   Neurological:      General: No focal deficit present.      Mental Status: He is alert and oriented to person, place, and time.      Cranial Nerves: No cranial nerve deficit.      Gait: Gait normal.         Fluids    Intake/Output Summary (Last 24 hours) at 1/19/2024 1439  Last data filed at 1/19/2024 1343  Gross per 24 hour   Intake 944.17 ml   Output 2170 ml   Net -1225.83 ml       Laboratory  Recent Labs     01/18/24  1752 01/19/24  0110   WBC 10.8 8.3   RBC 4.76 4.47*   HEMOGLOBIN 14.4 13.7*   HEMATOCRIT 42.2 40.3*   MCV 88.7 90.2   MCH 30.3 30.6   MCHC 34.1 34.0   RDW 42.5 43.7   PLATELETCT 247 231   MPV 11.4 11.8     Recent Labs     01/18/24  1752 01/19/24  0110   SODIUM 140 140   POTASSIUM 3.7 3.5*   CHLORIDE 104 106   CO2 24 23   GLUCOSE 101* 105*   BUN 24* 23*   CREATININE 1.00 0.96   CALCIUM 10.2 9.6     Recent Labs     01/18/24  1752   APTT 29.1   INR 0.98               Imaging  DX-HIP-COMPLETE - UNILATERAL 2+  LEFT   Final Result      Digitized intraoperative radiograph is submitted for review. This examination is not for diagnostic purpose but for guidance during a surgical procedure. Please see the patient's chart for full procedural details.         INTERPRETING LOCATION: [address], [place], [address]      DX-PORTABLE FLUOROSCOPY < 1 HOUR   Final Result      Portable fluoroscopy utilized for 42.6 seconds.         INTERPRETING LOCATION: [address], [place], [address]      DX-CHEST-PORTABLE (1 VIEW)   Final Result      1.  There is minimal linear left lower lobe atelectasis.           Assessment/Plan  * Left hip fracture- (present on admission)  Assessment & Plan  Due to trauma which occurred approximately 3 weeks prior to admission  Femur fracture identified on MRI    1/19:   Now status post open reduction internal fixation left femoral neck fracture performed on 1/18/2024  Postoperative care including pain control with oxycodone and Dilaudid ordered, monitor respiratory status while on Dilaudid  Check CBC a.m.  PT/OT, post acute planning  I ordered Lovenox per orthopedic recommendations    Hypokalemia  Assessment & Plan  1/19:  Replace PO    Elevated blood pressure reading- (present on admission)  Assessment & Plan  1/19:  Does not have a diagnosis of primary hypertension  May be secondary to pain  IV PRNs  Consider starting antihypertensive if persists    Prostatic hyperplasia- (present on admission)  Assessment & Plan  Finasteride         VTE prophylaxis:    enoxaparin ppx      I have performed a physical exam and reviewed and updated ROS and Plan today (1/19/2024). In review of yesterday's note (1/18/2024), there are no changes except as documented above.

## 2024-01-19 NOTE — H&P
Hospital Medicine History & Physical Note    Date of Service  1/18/2024    Primary Care Physician  Brett Melo M.D.    Consultants  Jordan Roberts      Code Status  Full Code    Chief Complaint  Chief Complaint   Patient presents with    Hip Pain     Pt c/o Lt hip Pn from Fx, stated that he has Sx scheduled tonight, Pt stated that the Pn is too much right now, Pt stated that he tripped & fell 3-wks ago, went head first into the sidewalk; Pt currently in WC; - blood thinners, -N/V/D;     History of Presenting Illness  Real Yadav is a 83 y.o. male with no significant past medical history other than prostatic hyperplasia who presented 1/18/2024 with left hip pain after a fall.  The patient has been having progressively worsening hip pain over the past 3 weeks.  He did fall hitting his knee and hip and x-rays were unremarkable however he underwent an MRI evaluation that is concerning for left femoral neck fracture.  Pain is moderate-severe.  Worse with bearing weight and with walking.  The patient denies having fevers or chills.    I discussed the plan of care with emergency department physician, the patient and patient family present at bedside in the emergency room.    Review of Systems  Review of Systems   Constitutional:  Negative for chills and fever.   Eyes:  Negative for discharge and redness.   Respiratory:  Negative for cough, shortness of breath and stridor.    Cardiovascular:  Negative for chest pain and leg swelling.   Gastrointestinal:  Negative for abdominal pain and vomiting.   Genitourinary:  Negative for flank pain.   Musculoskeletal:  Positive for falls and joint pain. Negative for myalgias.        Left hip pain   Skin: Negative.    Neurological:  Negative for focal weakness.   Endo/Heme/Allergies:  Does not bruise/bleed easily.   Psychiatric/Behavioral:  The patient is not nervous/anxious.      Past Medical History   has a past medical history of Gout, H/O measles, Hemorrhoid, History of  chickenpox, and Venereal disease.    Surgical History   has a past surgical history that includes vasectomy (1973) and appendectomy (1961).     Family History  family history includes Arthritis in his brother and another family member; Cancer in his mother and another family member; Heart Attack in his father; Heart Disease in his father and another family member; Lung Disease in his sister; Other in an other family member.      Social History   reports that he quit smoking about 37 years ago. His smoking use included cigarettes. He started smoking about 77 years ago. He has a 60.0 pack-year smoking history. He has never used smokeless tobacco. He reports current alcohol use of about 5.4 oz of alcohol per week. He reports that he does not use drugs.    Allergies  No Known Allergies    Medications  Prior to Admission Medications   Prescriptions Last Dose Informant Patient Reported? Taking?   finasteride (PROSCAR) 5 MG Tab   Yes No   Sig: Take 5 mg by mouth every day.   indomethacin SR (INDOCIN SR) 75 MG Cap CR   No No   Sig: Take 1 Capsule by mouth every day.      Facility-Administered Medications: None     Physical Exam  Temp:  [36.6 °C (97.9 °F)] 36.6 °C (97.9 °F)  Pulse:  [58-73] 68  Resp:  [18] 18  BP: (117-188)/(74-95) 188/95  SpO2:  [93 %-97 %] 97 %  Blood Pressure : (!) 150/82   Temperature: 36.6 °C (97.9 °F)   Pulse: 64   Respiration: 18   Pulse Oximetry: 93 %     Physical Exam  Constitutional:       General: He is not in acute distress.     Appearance: He is not ill-appearing or diaphoretic.   HENT:      Head: Atraumatic.      Right Ear: External ear normal.      Left Ear: External ear normal.      Nose: No congestion or rhinorrhea.      Mouth/Throat:      Mouth: Mucous membranes are moist.   Eyes:      General: No scleral icterus.        Right eye: No discharge.         Left eye: No discharge.      Pupils: Pupils are equal, round, and reactive to light.   Cardiovascular:      Rate and Rhythm: Normal rate  "and regular rhythm.   Pulmonary:      Effort: Pulmonary effort is normal.   Abdominal:      General: There is no distension.   Musculoskeletal:      Cervical back: Neck supple. No rigidity. No muscular tenderness.      Right lower leg: No edema.      Left lower leg: No edema.      Comments: Reduced range of motion of the left hip secondary to pain   Skin:     Coloration: Skin is not jaundiced or pale.   Neurological:      Mental Status: He is alert and oriented to person, place, and time.      Coordination: Coordination normal.   Psychiatric:         Mood and Affect: Mood normal.         Behavior: Behavior normal.       Laboratory:  Recent Labs     01/18/24  1752   WBC 10.8   RBC 4.76   HEMOGLOBIN 14.4   HEMATOCRIT 42.2   MCV 88.7   MCH 30.3   MCHC 34.1   RDW 42.5   PLATELETCT 247   MPV 11.4     Recent Labs     01/18/24  1752   SODIUM 140   POTASSIUM 3.7   CHLORIDE 104   CO2 24   GLUCOSE 101*   BUN 24*   CREATININE 1.00   CALCIUM 10.2     Recent Labs     01/18/24  1752   GLUCOSE 101*     Recent Labs     01/18/24  1752   APTT 29.1   INR 0.98     No results for input(s): \"NTPROBNP\" in the last 72 hours.      No results for input(s): \"TROPONINT\" in the last 72 hours.    Imaging:  DX-CHEST-PORTABLE (1 VIEW)   Final Result      1.  There is minimal linear left lower lobe atelectasis.        Assessment/Plan:  Justification for Admission Status  I anticipate this patient will require at least two midnights for appropriate medical management, necessitating inpatient admission because the patient has a hip fracture, and will require ortho consult and operative repair     Patient will need a Med/Surg bed on ORTHOPEDICS service.  The patient has a hip fracture.     * Left hip fracture- (present on admission)  Assessment & Plan  Seen on MRI.  Orthopedics consulted, plan for OR tomorrow morning   N.p.o. after midnight  I will start acetaminophen, oxycodone and hydromorphone as needed  Consider physical and Occupational Therapy, " pharmacologic prophylaxis when okay with orthopedics   Revised Cardiac Risk Index for Pre-Operative Risk score of:   1 points, Class II Risk  The patient has a 6.0 % 30-day risk of death, MI, or cardiac arrest     Elevated blood pressure reading- (present on admission)  Assessment & Plan  No known history of primary hypertension  Likely secondary to severe pain  Multimodal pain control  I will start as needed labetalol for extreme hypertension  Consider starting scheduled antihypertensive medications according to blood pressure trend    Prostatic hyperplasia- (present on admission)  Assessment & Plan  Resume finasteride      VTE prophylaxis: SCDs/TEDs

## 2024-01-19 NOTE — OR NURSING
1057 Received report from Jacki CHILD. Pt waking at this time. Dressing in place to left hip, CDI. Pedal pulse 2+, cap refill <3 sec. Ice in place. Pt states pain 2/10 at this time.     1110 Pt states feeling nausea and some dizziness, mediated per MAR.     1118 Pt states pain 5-6/10, medicated per MAR.     1130 pt states minimal change, medicated per MAR. Pt BP elevated, voalted anesthesia for orders.    1140 Pt medicated per MAR for pain.     1156 Pt medicated per MAR for elevated BP and pain.     1200 Pt able to void 300cc. Pt states pain increased to 6-7/10.    1218 Report back to Jacki CHILD.

## 2024-01-19 NOTE — OR NURSING
1050- Patient arrived to [facility] from OR via bed. Report received from anesthesia and RN. Respirations are spontaneous and unlabored VSS on 6 L via mask. Dressing is CDI. Cold pack applied. LLE: pedal pulse 2+ cap refill less than 3 seconds, warm, pink. Pt sleeping not roused.     1057 Report to Ofe CHILD.     1218- Resumed patient care.  Pt using the urinal at this time. Pt with 400 ML out.     1230- Pt sleeping at time. Not roused.     1240- BP continues to be elevated. Contacted MD regarding medication for HTN.    1245- Roused the patient at this time. Pt pain is decrease to 3/10 and is tolerable at this time.     1251- Pt medicated for high BP of 184/88. SEE MAR. Pt medicated with Hyrdalazine as HR is <60.     1300- Pt roused at this time. Pt pain is decreased to 3/10 and is tolerable. Pt goes back to sleep.     1315- Pt sleeping. Pt BP at baseline at this time.     1327- Patient met criteria for transfer to room. Pain is tolerable. Tolerating oral fluids. Report to Austyn.     1330- Pt sleeping, roused pain is 2/10 and is tolerable at this time.     1343- Patient transferred  to room. Patient states good pain control. Denies N/V, tolerating PO well. Surgical dressing CDI. Ice pack sent with pt. Pt on oxygen tank at full.

## 2024-01-19 NOTE — ED PROVIDER NOTES
ED Provider Note    CHIEF COMPLAINT  Chief Complaint   Patient presents with    Hip Pain     Pt c/o Lt hip Pn from Fx, stated that he has Sx scheduled tonight, Pt stated that the Pn is too much right now, Pt stated that he tripped & fell 3-wks ago, went head first into the sidewalk; Pt currently in WC; - blood thinners, -N/V/D;       EXTERNAL RECORDS REVIEWED  Outpatient Notes reviewed office visit progress note by Dr. Jones of the sports medicine service dated January 16, 2024.  Patient seen for left hip pain and pain in the left knee.  Seen in follow-up from emergency department visit.  History of fall.  Plan for MRI to evaluate for possible lateral meniscus tear, recommend nonweightbearing with crutches or wheelchair use.  MRIs of left hip and left knee done today but unable to view images, no report in the system.    HPI/ROS  LIMITATION TO HISTORY   Select: : None  OUTSIDE HISTORIAN(S):  Significant other relates patient was told by their physician Dr. Jones today that he had a fracture to the left hip that would require surgical intervention.  They were instructed to come to this facility, they were told Dr. Barker of orthopedics had been consulted.    Real Yadav is a 83 y.o. male who presents for evaluation of subacute pain to the left lower extremity.  Patient relates mechanical ground-level fall approximately 3 weeks ago.  He relates blunt trauma to the left knee during the fall and following that pain to the left hip as well.  He has been having difficulty ambulating and has been placed on crutches by his primary care provider Dr. Shabazz.  Patient notes initial imaging was unremarkable, MRIs done today demonstrate fracture to left hip.  Unfortunately I am unable to view the MRI report as delineated above given these were done at outlying facility.  Patient was told today had a fracture and should come to the emergency department to be assessed.    PAST MEDICAL HISTORY   has a past medical  "history of Gout, H/O measles, Hemorrhoid, History of chickenpox, and Venereal disease.    SURGICAL HISTORY   has a past surgical history that includes vasectomy () and appendectomy ().    FAMILY HISTORY  Family History   Problem Relation Age of Onset    Cancer Mother     Heart Disease Father     Heart Attack Father     Arthritis Brother     Lung Disease Sister     Arthritis Other     Cancer Other     Other Other         GOUT    Heart Disease Other        SOCIAL HISTORY  Social History     Tobacco Use    Smoking status: Former     Current packs/day: 0.00     Average packs/day: 1.5 packs/day for 40.0 years (60.0 ttl pk-yrs)     Types: Cigarettes     Start date: 1946     Quit date: 1986     Years since quittin.4    Smokeless tobacco: Never   Substance and Sexual Activity    Alcohol use: Yes     Alcohol/week: 5.4 oz     Types: 7 Glasses of wine, 2 Shots of liquor per week    Drug use: No    Sexual activity: Not on file       CURRENT MEDICATIONS  Home Medications       Reviewed by Farrukh Ruiz R.N. (Registered Nurse) on 24 at 1713  Med List Status: Not Addressed     Medication Last Dose Status   finasteride (PROSCAR) 5 MG Tab  Active   indomethacin SR (INDOCIN SR) 75 MG Cap CR  Active                    ALLERGIES  No Known Allergies    PHYSICAL EXAM  VITAL SIGNS: BP (!) 150/87   Pulse 62   Temp 36.6 °C (97.9 °F) (Temporal)   Resp 18   Ht 1.854 m (6' 1\")   Wt 81.6 kg (180 lb)   SpO2 93%   BMI 23.75 kg/m²    General: Alert, no acute distress  Skin: Warm, dry, normal for ethnicity  Head: Normocephalic, atraumatic  Neck: Trachea midline  Eye: PERRL, normal conjunctiva  ENMT: Oral mucosa moist  Cardiovascular: Regular rate and rhythm, No murmur, Normal peripheral perfusion  Respiratory: Lungs CTA, respirations are non-labored, breath sounds are equal  Musculoskeletal: Moderate swelling throughout the soft tissues of the left thigh, less so to left lower leg.  Swelling and generalized " tenderness to the left knee can point tenderness to the anterior aspect of the left hip without step-off.  No limb shortening.  2+ DP and PT pulses, brisk capillary refill.  Neurological: Alert and oriented to person, place, time, and situation, dorsal and plantarflexion of left foot is grossly intact, sensation to pain and light touch with regard to left lower extremity is grossly intact.  Lymphatics: No left lower extreme lymphangitis  Psychiatric: Cooperative, appropriate mood & affect     DIAGNOSTIC STUDIES / PROCEDURES      LABS  Results for orders placed or performed during the hospital encounter of 01/18/24   CBC WITH DIFFERENTIAL   Result Value Ref Range    WBC 10.8 4.8 - 10.8 K/uL    RBC 4.76 4.70 - 6.10 M/uL    Hemoglobin 14.4 14.0 - 18.0 g/dL    Hematocrit 42.2 42.0 - 52.0 %    MCV 88.7 81.4 - 97.8 fL    MCH 30.3 27.0 - 33.0 pg    MCHC 34.1 32.3 - 36.5 g/dL    RDW 42.5 35.9 - 50.0 fL    Platelet Count 247 164 - 446 K/uL    MPV 11.4 9.0 - 12.9 fL    Neutrophils-Polys 75.10 (H) 44.00 - 72.00 %    Lymphocytes 14.00 (L) 22.00 - 41.00 %    Monocytes 7.90 0.00 - 13.40 %    Eosinophils 2.00 0.00 - 6.90 %    Basophils 0.50 0.00 - 1.80 %    Immature Granulocytes 0.50 0.00 - 0.90 %    Nucleated RBC 0.00 0.00 - 0.20 /100 WBC    Neutrophils (Absolute) 8.13 (H) 1.82 - 7.42 K/uL    Lymphs (Absolute) 1.52 1.00 - 4.80 K/uL    Monos (Absolute) 0.86 (H) 0.00 - 0.85 K/uL    Eos (Absolute) 0.22 0.00 - 0.51 K/uL    Baso (Absolute) 0.05 0.00 - 0.12 K/uL    Immature Granulocytes (abs) 0.05 0.00 - 0.11 K/uL    NRBC (Absolute) 0.00 K/uL   BASIC METABOLIC PANEL   Result Value Ref Range    Sodium 140 135 - 145 mmol/L    Potassium 3.7 3.6 - 5.5 mmol/L    Chloride 104 96 - 112 mmol/L    Co2 24 20 - 33 mmol/L    Glucose 101 (H) 65 - 99 mg/dL    Bun 24 (H) 8 - 22 mg/dL    Creatinine 1.00 0.50 - 1.40 mg/dL    Calcium 10.2 8.4 - 10.2 mg/dL    Anion Gap 12.0 7.0 - 16.0   APTT   Result Value Ref Range    APTT 29.1 24.7 - 36.0 sec    PROTHROMBIN TIME   Result Value Ref Range    PT 13.4 12.0 - 14.6 sec    INR 0.98 0.87 - 1.13   ESTIMATED GFR   Result Value Ref Range    GFR (CKD-EPI) 75 >60 mL/min/1.73 m 2        RADIOLOGY  I have independently interpreted the diagnostic imaging associated with this visit and am waiting the final reading from the radiologist.   My preliminary interpretation is as follows: No effusion, no infiltrate  Radiologist interpretation:   DX-CHEST-PORTABLE (1 VIEW)   Final Result      1.  There is minimal linear left lower lobe atelectasis.           COURSE & MEDICAL DECISION MAKING    ED Observation Status? No; Patient does not meet criteria for ED Observation.     1813: I have heard back from the on-call for [facility] (Dr. Barker's group) Dr. Ortega.  He is unaware of this patient but notes that Dr. Barker is actually on-call at [facility].  He will speak with Dr. Barker with regard to if he is aware of this case or not.    1820: I have spoken with Dr. Mark Barker of the [facility].  He relates he was not consulted on this case.  He is not on-call at our facility at this time.    1827: I have heard back again from Dr. Ortega.  He relates he has been able to access the images, he has found a nondisplaced femoral neck fracture on the left.  Concurs with plan for operative intervention tomorrow, recommends admission to medicine for pain control overnight.  N.p.o. after midnight.     1830  Spoke with Dr. Tai the hospitalist who accepts patient to his service for admission    INITIAL ASSESSMENT, COURSE AND PLAN  Care Narrative: This patient is a very pleasant 83-year-old gentleman who presents for evaluation of persistent pain to the left knee and left hip after a ground-level fall 3 weeks ago.  History and exam as above.  Patient relates negative imaging previously, he had an MRI done today that demonstrated a nondisplaced femoral neck fracture on the left.  As such I have  consulted orthopedics, spoke with Dr. Tony yap who concurs with plan for operative intervention tomorrow.  I spoke with Dr. Garnett the hospitalist who accepts patient to his service for admission for pain control overnight.  Patient is n.p.o. after midnight pending operative intervention in the morning.  HTN/IDDM FOLLOW UP:  The patient is referred to a primary physician for blood pressure management, diabetic screening, and for all other preventive health concerns      ADDITIONAL PROBLEM LIST  Hip fracture left, inability bear weight secondary to left hip injury  DISPOSITION AND DISCUSSIONS  I have discussed management of the patient with the following physicians and MEME's: Spoke with Faisal Barker and rickey engle of the orthopedic service, consult with Dr. Tai in the hospital medicine service as delineated above.    Discussion of management with other QHP or appropriate source(s): None     Escalation of care considered, and ultimately not performed:diagnostic imaging orthopedics has been able to view the images, as such no indication for repeat imaging at this time    Barriers to care at this time, including but not limited to:  Orthopedic surgeon the patient was told was consulted is not on-call today at this facility .     Decision tools and prescription drugs considered including, but not limited to:  NA .    FINAL DIAGNOSIS  1. Closed fracture of neck of left femur, initial encounter ([facility])           Electronically signed by: India Blanco M.D., 1/18/2024 5:33 PM

## 2024-01-19 NOTE — PROGRESS NOTES
4 Eyes Skin Assessment Completed by elliot fountain, RN and zenon patterson, MATEUSZ.    Head Scab  Ears WDL  Nose WDL  Mouth WDL  Neck WDL  Breast/Chest WDL  Shoulder Blades WDL  Spine WDL  (R) Arm/Elbow/Hand WDL  (L) Arm/Elbow/Hand WDL  Abdomen WDL  Groin WDL  Scrotum/Coccyx/Buttocks Excoriation  (R) Leg WDL  (L) Leg WDL  (R) Heel/Foot/Toe WDL  (L) Heel/Foot/Toe WDL          Devices In Places SCD's      Interventions In Place Pillows    Possible Skin Injury No    Pictures Uploaded Into Epic N/A  Wound Consult Placed N/A  RN Wound Prevention Protocol Ordered No

## 2024-01-19 NOTE — DISCHARGE INSTRUCTIONS
What to Expect Post Anesthesia    Rest and take it easy for the first 24 hours.  A responsible adult is recommended to remain with you during that time.  It is normal to feel sleepy.  We encourage you to not do anything that requires balance, judgment or coordination.    FOR 24 HOURS DO NOT:  Drive, operate machinery or run household appliances.  Drink beer or alcoholic beverages.  Make important decisions or sign legal documents.    To avoid nausea, slowly advance diet as tolerated, avoiding spicy or greasy foods for the first day.  Add more substantial food to your diet according to your provider's instructions.  Babies can be fed formula or breast milk as soon as they are hungry.  INCREASE FLUIDS AND FIBER TO AVOID CONSTIPATION.    MILD FLU-LIKE SYMPTOMS ARE NORMAL.  YOU MAY EXPERIENCE GENERALIZED MUSCLE ACHES, THROAT IRRITATION, HEADACHE AND/OR SOME NAUSEA.    If any questions arise, call your provider.  If your provider is not available, please feel free to call the Surgical Center at [phone].    MEDICATIONS: Resume taking daily medication.  Take prescribed pain medication with food.  If no medication is prescribed, you may take non-aspirin pain medication if needed.  PAIN MEDICATION CAN BE VERY CONSTIPATING.  Take a stool softener or laxative such as senokot, pericolace, or milk of magnesia if needed.    Last pain medication given at     Diet    Resume your normal diet as tolerated.  A diet low in cholesterol, fat, and sodium is recommended for good health.

## 2024-01-19 NOTE — CARE PLAN
The patient is Watcher - Medium risk of patient condition declining or worsening         Progress made toward(s) clinical / shift goals:  pt. Reports 2/10 pain, VSS, good pulses in LLE pt. Reports no numbness of tingling      Problem: Knowledge Deficit - Standard  Goal: Patient and family/care givers will demonstrate understanding of plan of care, disease process/condition, diagnostic tests and medications  Outcome: Progressing     Problem: Pain - Standard  Goal: Alleviation of pain or a reduction in pain to the patient’s comfort goal  Outcome: Progressing     Problem: Fall Risk  Goal: Patient will remain free from falls  Outcome: Progressing     Problem: Risk for Bleeding  Goal: Patient will take measures to prevent bleeding and recognizes signs of bleeding that need to be reported immediately to a health care professional  Outcome: Progressing  Goal: Patient will not experience bleeding as evidenced by normal blood pressure, stable hematocrit and hemoglobin levels and desired ranges for coagulation profiles  Outcome: Progressing

## 2024-01-19 NOTE — THERAPY
Physical Therapy Contact Note    Patient Name: Real Yadav  Age:  83 y.o., Sex:  male  Medical Record #: [id number]  Today's Date: 1/19/2024 01/19/24 0714   Interdisciplinary Plan of Care Collaboration   IDT Collaboration with  Nursing   Collaboration Comments PT order received. Pt is currently awaiting surgical intervention for hip fracture. Will plan to see once post-op.

## 2024-01-19 NOTE — ED NOTES
PIV placed, blood work collected & sent to lab, Pt medicated per MAR, Pt given warm blanket, Pt denied having any other needs at this time, no distress noted;

## 2024-01-19 NOTE — ANESTHESIA PREPROCEDURE EVALUATION
Case: [id number] Date/Time: 01/19/24 0915    Procedure: ORIF, HIP (Left)    Location:  OR  / SURGERY [facility]    Surgeons: Jaylan Turcios M.D.            Relevant Problems   No relevant active problems       Physical Exam    Airway   Mallampati: II  TM distance: >3 FB  Neck ROM: full       Cardiovascular - normal exam  Rhythm: regular  Rate: normal     Dental - normal exam           Pulmonary - normal exam     Abdominal    Neurological - normal exam                   Anesthesia Plan    ASA 2       Plan - general       Airway plan will be LMA          Induction: intravenous    Postoperative Plan: Postoperative administration of opioids is intended.    Pertinent diagnostic labs and testing reviewed    Informed Consent:    Anesthetic plan and risks discussed with patient.    Use of blood products discussed with: patient whom consented to blood products.

## 2024-01-19 NOTE — OP REPORT
DATE OF OPERATION: 1/19/2024     PREOPERATIVE DIAGNOSIS:  Closed left minimally displaced femoral neck fracture    POSTOPERATIVE DIAGNOSIS: Same    PROCEDURE PERFORMED:   Open reduction internal fixation left femoral neck fracture    SURGEON: Jaylan Turcios M.D.     ASSISTANT: None    ANESTHESIA: General    SPECIMEN: None    ESTIMATED BLOOD LOSS: 50 mL    IMPLANTS: Synthes femoral neck system 100 mm barrel, 100 mm antirotation screw, single interlocking screw    INDICATIONS: The patient is a 83 y.o. male who presented with a closed left femoral neck fracture.  This was a subacute injury occurring approximately 3 weeks ago but initial imaging at an outside facility apparently indicated no fracture.  Patient had progressive inability to ambulate and represented and was found to have a subacute left femoral neck fracture.  On discussion with the patient, decision was made to move forward with surgical fixation of his fracture.  I discussed with him other options including continued conservative management versus arthroplasty, including risk of failure with surgical fixation and he elected to proceed.  I discussed the risks and benefits of the above procedure which include but are not limited to risks of infection, wound healing complication, neurovascular injury, pain, malunion, non-union, malrotation, and the medical risks of anesthesia including MI, stroke, and death.  Alternatives to surgery were also discussed, including non-operative management, which I did not recommend.  The patient and/or their POA was in agreement with the plan to proceed, and the informed consent was signed and documented.    DESCRIPTION OF PROCEDURE:  Patient was seen in the preoperative holding area on the day of surgery and marked on the operative site which was the left hip. They were transported to the operating room.  Anesthesia was induced.  The patient was then transferred to the [place] table in well-padded fracture boots and a  well-padded perineal post.  The left lower extremity was then prescribed with a CHG brush followed by an alcohol bath and then prepped and draped in usual sterile fashion.  A timeout was then called was the correct patient, correct site, correct procedure were confirmed by all operative personnel and all were in agreement.    We then turned our attention to the left hip.  I began by marking out my incision using fluoroscopy for the planned Synthes femoral neck system implant.  I incised to the skin laterally with a 10 blade incising down to the level of the IT band which was divided longitudinally and retracted exposing the underlying vastus lateralis muscle.  This was bluntly divided using a Cohn elevator and then the aiming device was placed against the lateral cortex of the femur and appropriate starting position of the pin was confirmed on AP and lateral views.  Guidepin for the FNS was then placed up the head neck segment at the appropriate depth.  The aiming device was removed and the depth gauge was used to measure.  We then set the drill 200 mm and drilled for the femoral neck system barrel.  The implant was then chosen which was 100 mm Synthes FNS.  This was assembled on the back table and then implanted over the guidepin and impacted to its full depth.  I then drilled for and placed a 100 mm antirotation screw according to the technique guide.  I then placed a single interlocking screw through the sideplate portion.  The insertion device was removed.  Final fluoroscopic images were then obtained which demonstrated maintenance of fracture alignment and appropriate safe position of all implants including appropriate tip apex distance.  The wound was then irrigated thoroughly with saline.  1 g vancomycin powder was placed into the wound.  The IT band was closed using #0 Vicryl in a interrupted figure-of-eight fashion.  Skin was then closed in layers using 2-0 Vicryl followed by staples.  Sterile dressing was  then placed.  The patient was then awoken from anesthesia and taken off the fracture table.  He was transferred to the PACU in stable condition without immediate complication.    POSTOPERATIVE PLAN:      Inpatient plan: PACU to floor.  24 hours of antibiotics.  Mobilize with PT and OT.  Weightbearing status: Weightbearing as tolerated left lower extremity  DVT prophylaxis: SCDs and lovenox until mobilizing well, then aspirin 81mg BID for 4 weeks  Outpatient plan: The patient will follow up in clinic in 2 weeks for wound check, suture/staple removal (if applicable), and xrays.  If the patient is at a facility at that time, wound check and suture/staple removal may be performed by nursing care and the patient should instead follow up at the 6 week post operative sonam for repeat clinical check and xrays.      ____________________________________   Jaylan Turcios M.D.   DD: 1/19/2024  10:44 AM

## 2024-01-20 ENCOUNTER — PHARMACY VISIT (OUTPATIENT)
Dept: PHARMACY | Facility: MEDICAL CENTER | Age: 84
End: 2024-01-20
Payer: COMMERCIAL

## 2024-01-20 VITALS
TEMPERATURE: 97.7 F | OXYGEN SATURATION: 95 % | RESPIRATION RATE: 16 BRPM | BODY MASS INDEX: 28.93 KG/M2 | HEIGHT: 73 IN | SYSTOLIC BLOOD PRESSURE: 152 MMHG | HEART RATE: 67 BPM | DIASTOLIC BLOOD PRESSURE: 74 MMHG | WEIGHT: 218.26 LBS

## 2024-01-20 DIAGNOSIS — S72.002A CLOSED FRACTURE OF LEFT HIP, INITIAL ENCOUNTER (HCC): ICD-10-CM

## 2024-01-20 PROBLEM — E87.6 HYPOKALEMIA: Status: RESOLVED | Noted: 2024-01-19 | Resolved: 2024-01-20

## 2024-01-20 LAB
ANION GAP SERPL CALC-SCNC: 10 MMOL/L (ref 7–16)
BASOPHILS # BLD AUTO: 0.2 % (ref 0–1.8)
BASOPHILS # BLD: 0.03 K/UL (ref 0–0.12)
BUN SERPL-MCNC: 16 MG/DL (ref 8–22)
CALCIUM SERPL-MCNC: 9.6 MG/DL (ref 8.4–10.2)
CHLORIDE SERPL-SCNC: 106 MMOL/L (ref 96–112)
CO2 SERPL-SCNC: 24 MMOL/L (ref 20–33)
CREAT SERPL-MCNC: 0.88 MG/DL (ref 0.5–1.4)
EOSINOPHIL # BLD AUTO: 0.01 K/UL (ref 0–0.51)
EOSINOPHIL NFR BLD: 0.1 % (ref 0–6.9)
ERYTHROCYTE [DISTWIDTH] IN BLOOD BY AUTOMATED COUNT: 42.6 FL (ref 35.9–50)
GFR SERPLBLD CREATININE-BSD FMLA CKD-EPI: 86 ML/MIN/1.73 M 2
GLUCOSE SERPL-MCNC: 138 MG/DL (ref 65–99)
HCT VFR BLD AUTO: 42.6 % (ref 42–52)
HGB BLD-MCNC: 14.5 G/DL (ref 14–18)
IMM GRANULOCYTES # BLD AUTO: 0.07 K/UL (ref 0–0.11)
IMM GRANULOCYTES NFR BLD AUTO: 0.4 % (ref 0–0.9)
LYMPHOCYTES # BLD AUTO: 1.15 K/UL (ref 1–4.8)
LYMPHOCYTES NFR BLD: 6.6 % (ref 22–41)
MCH RBC QN AUTO: 30.7 PG (ref 27–33)
MCHC RBC AUTO-ENTMCNC: 34 G/DL (ref 32.3–36.5)
MCV RBC AUTO: 90.1 FL (ref 81.4–97.8)
MONOCYTES # BLD AUTO: 0.62 K/UL (ref 0–0.85)
MONOCYTES NFR BLD AUTO: 3.5 % (ref 0–13.4)
NEUTROPHILS # BLD AUTO: 15.64 K/UL (ref 1.82–7.42)
NEUTROPHILS NFR BLD: 89.2 % (ref 44–72)
NRBC # BLD AUTO: 0 K/UL
NRBC BLD-RTO: 0 /100 WBC (ref 0–0.2)
PLATELET # BLD AUTO: 225 K/UL (ref 164–446)
PMV BLD AUTO: 11.6 FL (ref 9–12.9)
POTASSIUM SERPL-SCNC: 4 MMOL/L (ref 3.6–5.5)
RBC # BLD AUTO: 4.73 M/UL (ref 4.7–6.1)
SODIUM SERPL-SCNC: 140 MMOL/L (ref 135–145)
WBC # BLD AUTO: 17.5 K/UL (ref 4.8–10.8)

## 2024-01-20 PROCEDURE — 36415 COLL VENOUS BLD VENIPUNCTURE: CPT

## 2024-01-20 PROCEDURE — 97535 SELF CARE MNGMENT TRAINING: CPT

## 2024-01-20 PROCEDURE — 700102 HCHG RX REV CODE 250 W/ 637 OVERRIDE(OP): Performed by: HOSPITALIST

## 2024-01-20 PROCEDURE — A9270 NON-COVERED ITEM OR SERVICE: HCPCS | Performed by: HOSPITALIST

## 2024-01-20 PROCEDURE — 85025 COMPLETE CBC W/AUTO DIFF WBC: CPT

## 2024-01-20 PROCEDURE — 99239 HOSP IP/OBS DSCHRG MGMT >30: CPT | Performed by: HOSPITALIST

## 2024-01-20 PROCEDURE — 700105 HCHG RX REV CODE 258: Performed by: ORTHOPAEDIC SURGERY

## 2024-01-20 PROCEDURE — 97162 PT EVAL MOD COMPLEX 30 MIN: CPT

## 2024-01-20 PROCEDURE — 94760 N-INVAS EAR/PLS OXIMETRY 1: CPT

## 2024-01-20 PROCEDURE — 97165 OT EVAL LOW COMPLEX 30 MIN: CPT

## 2024-01-20 PROCEDURE — RXMED WILLOW AMBULATORY MEDICATION CHARGE: Performed by: HOSPITALIST

## 2024-01-20 PROCEDURE — 700111 HCHG RX REV CODE 636 W/ 250 OVERRIDE (IP): Performed by: ORTHOPAEDIC SURGERY

## 2024-01-20 PROCEDURE — 80048 BASIC METABOLIC PNL TOTAL CA: CPT

## 2024-01-20 RX ORDER — OXYCODONE HYDROCHLORIDE 5 MG/1
5 TABLET ORAL EVERY 4 HOURS PRN
Qty: 30 TABLET | Refills: 0 | Status: SHIPPED | OUTPATIENT
Start: 2024-01-20 | End: 2024-01-20

## 2024-01-20 RX ORDER — OXYCODONE HYDROCHLORIDE 5 MG/1
5 TABLET ORAL
Qty: 15 TABLET | Refills: 0 | Status: SHIPPED | OUTPATIENT
Start: 2024-01-20 | End: 2024-01-25

## 2024-01-20 RX ORDER — LISINOPRIL 5 MG/1
5 TABLET ORAL DAILY
Qty: 30 TABLET | Refills: 0 | Status: SHIPPED | OUTPATIENT
Start: 2024-01-20

## 2024-01-20 RX ORDER — ASPIRIN 81 MG/1
81 TABLET ORAL 2 TIMES DAILY
Qty: 60 TABLET | Refills: 0 | Status: SHIPPED | OUTPATIENT
Start: 2024-01-20

## 2024-01-20 RX ADMIN — CEFAZOLIN 2 G: 2 INJECTION, POWDER, FOR SOLUTION INTRAMUSCULAR; INTRAVENOUS at 05:12

## 2024-01-20 RX ADMIN — FINASTERIDE 5 MG: 5 TABLET, FILM COATED ORAL at 05:12

## 2024-01-20 RX ADMIN — DOCUSATE SODIUM 50MG AND SENNOSIDES 8.6MG 2 TABLET: 8.6; 5 TABLET, FILM COATED ORAL at 05:12

## 2024-01-20 RX ADMIN — OXYCODONE HYDROCHLORIDE 5 MG: 5 TABLET ORAL at 08:55

## 2024-01-20 ASSESSMENT — ACTIVITIES OF DAILY LIVING (ADL): TOILETING: INDEPENDENT

## 2024-01-20 ASSESSMENT — COGNITIVE AND FUNCTIONAL STATUS - GENERAL
TOILETING: A LITTLE
DRESSING REGULAR LOWER BODY CLOTHING: A LITTLE
TURNING FROM BACK TO SIDE WHILE IN FLAT BAD: A LITTLE
SUGGESTED CMS G CODE MODIFIER DAILY ACTIVITY: CJ
TURNING FROM BACK TO SIDE WHILE IN FLAT BAD: A LITTLE
MOVING TO AND FROM BED TO CHAIR: A LITTLE
PERSONAL GROOMING: A LITTLE
WALKING IN HOSPITAL ROOM: A LITTLE
CLIMB 3 TO 5 STEPS WITH RAILING: A LITTLE
HELP NEEDED FOR BATHING: A LITTLE
SUGGESTED CMS G CODE MODIFIER MOBILITY: CJ
DAILY ACTIVITIY SCORE: 20
TOILETING: A LITTLE
MOBILITY SCORE: 20
MOVING TO AND FROM BED TO CHAIR: A LITTLE
MOBILITY SCORE: 22
SUGGESTED CMS G CODE MODIFIER MOBILITY: CJ
HELP NEEDED FOR BATHING: A LITTLE
SUGGESTED CMS G CODE MODIFIER DAILY ACTIVITY: CJ
DAILY ACTIVITIY SCORE: 21
DRESSING REGULAR LOWER BODY CLOTHING: A LITTLE

## 2024-01-20 ASSESSMENT — PAIN DESCRIPTION - PAIN TYPE
TYPE: SURGICAL PAIN
TYPE: ACUTE PAIN;SURGICAL PAIN
TYPE: ACUTE PAIN;SURGICAL PAIN

## 2024-01-20 ASSESSMENT — GAIT ASSESSMENTS
ASSISTIVE DEVICE: FRONT WHEEL WALKER
GAIT LEVEL OF ASSIST: STANDBY ASSIST
DISTANCE (FEET): 80
DEVIATION: ANTALGIC;STEP TO
DISTANCE (FEET): 3

## 2024-01-20 ASSESSMENT — FIBROSIS 4 INDEX: FIB4 SCORE: 1.26

## 2024-01-20 NOTE — PROGRESS NOTES
4 Eyes Skin Assessment Completed by MATEUSZ HOUSTON and MATEUSZ Bobby.    Head Scab  Ears WDL  Nose WDL  Mouth WDL  Neck WDL  Breast/Chest WDL  Shoulder Blades WDL  Spine WDL  (R) Arm/Elbow/Hand WDL  (L) Arm/Elbow/Hand WDL  Abdomen WDL  Groin WDL  Scrotum/Coccyx/Buttocks WDL  (R) Leg Scar  (L) Leg Incision s/p left hip ORIF  (R) Heel/Foot/Toe WDL  (L) Heel/Foot/Toe WDL          Devices In Places Pulse Ox and SCD's      Interventions In Place Waffle Overlay and Pillows    Possible Skin Injury No    Pictures Uploaded Into Epic N/A  Wound Consult Placed N/A  RN Wound Prevention Protocol Ordered No

## 2024-01-20 NOTE — FACE TO FACE
Face to Face Note  -  Durable Medical Equipment    Pedro Moore M.D. - NPI: [id number]  I certify that this patient is under my care and that they had a durable medical equipment(DME)face to face encounter by myself that meets the physician DME face-to-face encounter requirements with this patient on:    Date of encounter:   Patient:                    MRN:                       YOB: 2022  Real Yadav  [id number]  1940     The encounter with the patient was in whole, or in part, for the following medical condition, which is the primary reason for durable medical equipment:  Post-Op Surgery    I certify that, based on my findings, the following durable medical equipment is medically necessary:    Walkers.    My Clinical findings support the need for the above equipment due to:  Abnormal Gait

## 2024-01-20 NOTE — CARE PLAN
The patient is Stable - Low risk of patient condition declining or worsening    Shift Goals  Clinical Goals: Pt will  be able to void and ambulate atleast 50 ft within the shift. Pt will be able to control pain and report pain less than 5/10 post interventions. Pt will remain free from falls or injury throughout the shift.  Patient Goals: walk, sleep and rest  Family Goals: see pt walk before going home.    Progress made toward(s) clinical / shift goals:  Pt required 1 prn pain medication within the shift. Pt states relief post interventions. Pt seen sleeping comfortably in between rounds; even and unlabored breathing noted. Pt able to void within the shift. Pt able to ambulate less than 50 ft within the shift; awaiting PT this morning. Pt is free from falls or injury throughout the shift. Hourly rounding in progress.     Patient is not progressing towards the following goals: n/a

## 2024-01-20 NOTE — DISCHARGE PLANNING
Case Management Discharge Planning    Met with pt at bedside to obtain choice for [facility]. Choice form completed and faxed to [facility]    IMM delivered and faxed to [facility]

## 2024-01-20 NOTE — THERAPY
"Occupational Therapy   Initial Evaluation     Patient Name: Real Yadav  Age:  83 y.o., Sex:  male  Medical Record #: [id number]  Today's Date: 1/20/2024     Precautions  Precautions: Weight Bearing As Tolerated Left Lower Extremity    Assessment  Patient is 83 y.o. male with a diagnosis of GLF about 3 weeks ago resulting in L femoral neck fx that was initially not identified when seen in ER.  Pt was home unable to safely mobilize since then and f/u MRI revealed fracture.  Pt is s/o ORIF to L femur 1/19/24. Although he suffers from significant pain post op, he is moving well and able to tolerate simple ADL's. He reports he is in better condition with standing and walking than he has been in 3 weeks and feels confident he can manage at home with assist from SO and some additional DME.  No further OT needs in this setting, appears safe for home.      Plan    Occupational Therapy Initial Treatment Plan   Duration: Evaluation only    DC Equipment Recommendations: Front-Wheel Walker, Reacher  Discharge Recommendations: Anticipate that the patient will have no further occupational therapy needs after discharge from the hospital     Subjective    \"I can walk so much better already than I have been for several weeks.\"     Objective       01/20/24 1135   Prior Living Situation   Prior Services None   Housing / Facility 1 Story House   Steps Into Home 0   Steps In Home 0   Bathroom Set up   (walk in tub with seat, hand holds, HHS)   Equipment Owned Crutches;Tub / Shower Seat  (walk in tub)   Lives with - Patient's Self Care Capacity Significant Other   Comments SO highly functional, very supportive.   Prior Level of ADL Function   Self Feeding Independent   Grooming / Hygiene Independent   Bathing Independent   Dressing Independent   Toileting Independent   Comments up until fall 3 weeks ago- since then has required assist for dressing, bathing   Prior Level of IADL Function   Medication Management Independent   Laundry " Independent   Kitchen Mobility Independent   Finances Independent   Home Management Independent   Shopping Independent   Prior Level Of Mobility Independent Without Device in Community   Driving / Transportation Driving Independent   Occupation (Pre-Hospital Vocational) Retired Due To Age  (retired )   Leisure Interests Unable To Determine At This Time   Comments prior to fall 3 weeks ago   History of Falls   History of Falls Yes   Date of Last Fall   (GLF approx 3 weeks ago resulting in this currently treated fx)   Precautions   Precautions Weight Bearing As Tolerated Left Lower Extremity   Vitals   O2 Delivery Device None - Room Air   Pain 0 - 10 Group   Location Hip   Location Orientation Left   Description Aching;Sore   Therapist Pain Assessment 7;During Activity;Nurse Notified  (premedicated.  Reports its very sore but still better than it was at home prior to the ORIF)   Cognition    Cognition / Consciousness WDL   Level of Consciousness Alert   Comments pleasant, cooperative, attentive   Active ROM Upper Body   Active ROM Upper Body  WDL   Dominant Hand Right   Strength Upper Body   Upper Body Strength  WDL   Coordination Upper Body   Coordination WDL   Balance Assessment   Sitting Balance (Static) Good   Sitting Balance (Dynamic) Good   Standing Balance (Static) Fair +   Standing Balance (Dynamic) Fair   Weight Shift Sitting Good   Weight Shift Standing Fair   Comments with FWW   Bed Mobility    Supine to Sit Minimal Assist   Sit to Supine Minimal Assist   ADL Assessment   Eating Modified Independent   Grooming Modified Independent;Seated   Upper Body Dressing Modified Independent   Lower Body Dressing Minimal Assist  (with use of AE for pants, undies and socks.  plans to obtain reacher for home, SO to continue to help with socks and shoes as needed)   Toileting   (declined need to demo, feels confident he could manage based on getting it done prior to admit when pain was even worse)   How much help  from another person does the patient currently need...   Putting on and taking off regular lower body clothing? 3   Bathing (including washing, rinsing, and drying)? 3   Toileting, which includes using a toilet, bedpan, or urinal? 3   Putting on and taking off regular upper body clothing? 4   Taking care of personal grooming such as brushing teeth? 3   Eating meals? 4   6 Clicks Daily Activity Score 20   Functional Mobility   Sit to Stand Standby Assist   Bed, Chair, Wheelchair Transfer Standby Assist   Transfer Method Stand Step   Mobility steps nedside   Distance (Feet) 3   # of Times Distance was Traveled 2   Comments with FWW   Visual Perception   Visual Perception  WDL   Edema / Skin Assessment   Comments L hip dressing intact   Activity Tolerance   Sitting Edge of Bed 20 min   Standing 2 min x2   Comments limited by pain   Patient / Family Goals   Patient / Family Goal #1 home with SO assist   Education Group   Education Provided Role of Occupational Therapist;Activities of Daily Living;Adaptive Equipment   Role of Occupational Therapist Patient Response Patient;Significant Other;Acceptance;Explanation;Verbal Demonstration   ADL Patient Response Patient;Significant Other;Acceptance;Explanation;Demonstration;Handout;Verbal Demonstration   Adaptive Equipment Patient Response Patient;Significant Other;Acceptance;Explanation;Demonstration;Handout;Verbal Demonstration;Action Demonstration   Additional Comments Educ on LB dressing equip for increased indep and safety with ADl's

## 2024-01-20 NOTE — PROGRESS NOTES
DME walker provided for pt, awaiting meds to beds delivery.       1428: Pt discharged home in good and stable condition. Reviewed all discharge instructions and answered any questions. Meds to beds delivered. IV discontinued. Escorted downstairs via WC at 1430.

## 2024-01-20 NOTE — ANESTHESIA TIME REPORT
Anesthesia Start and Stop Event Times       Date Time Event    1/19/2024 0942 Ready for Procedure     0944 Anesthesia Start     1052 Anesthesia Stop          Responsible Staff  01/19/24      Name Role Begin End    Kai Bruner M.D. Anesth 0944 1052          Overtime Reason:  no overtime (within assigned shift)    Comments:

## 2024-01-20 NOTE — DISCHARGE PLANNING
Received choice form @: 1229  Agency/Facility name: [facility] referral per choice form @: 1247

## 2024-01-20 NOTE — PROGRESS NOTES
Received report from night shift RN, assumed care of pt. AAOx4, VSS on room air. Pt denies pain at this time. Dressing to L hip CDI with ice pad in place. Pt updated on plan of care for the day including therapy. Safety precautions in place, pt educated to call for assistance.

## 2024-01-20 NOTE — ANESTHESIA POSTPROCEDURE EVALUATION
Patient: Real Yadav    Procedure Summary       Date: 01/19/24 Room / Location:  OR 06 / SURGERY [facility]    Anesthesia Start: 0944 Anesthesia Stop: 1052    Procedure: ORIF, HIP (Left: Hip) Diagnosis: (left subacute femoral neck fracture)    Surgeons: Jaylan Turcios M.D. Responsible Provider: Kai Bruner M.D.    Anesthesia Type: general ASA Status: 2            Final Anesthesia Type: general  Last vitals  BP   Blood Pressure : (!) 161/81    Temp   36.3 °C (97.4 °F)    Pulse   (!) 58   Resp   16    SpO2   97 %      Anesthesia Post Evaluation    No notable events documented.     Nurse Pain Score: 5 (NPRS)

## 2024-01-20 NOTE — DISCHARGE SUMMARY
Discharge Summary    CHIEF COMPLAINT ON ADMISSION  Chief Complaint   Patient presents with    Hip Pain     Pt c/o Lt hip Pn from Fx, stated that he has Sx scheduled tonight, Pt stated that the Pn is too much right now, Pt stated that he tripped & fell 3-wks ago, went head first into the sidewalk; Pt currently in WC; - blood thinners, -N/V/D;       Reason for Admission  Hip Pain     Admission Date  1/18/2024    CODE STATUS  Full Code    HPI & HOSPITAL COURSE  This is a 83 y.o. male here with hip pain.     Keyon Yadav has past medical history includes gout and BPH.  The patient had a fall approximately 3 weeks prior to this admission.  He had extensive imaging and workup.  Due to ongoing pain and difficulty ambulating he was evaluated with an outpatient MRI which revealed a femoral neck fracture he was referred to the emergency room on 1/18/2024 and admitted for surgical intervention.      On 1/19/2024, he underwent open reduction, internal fixation of left femoral neck fracture.    The patient is recovering well from surgery, he worked with PT today and can be discharged home with a walker.    Therefore, he is discharged in good and stable condition to home with close outpatient follow-up.    The patient met 2-midnight criteria for an inpatient stay at the time of discharge.    Discharge Date  1/20/2024    FOLLOW UP ITEMS POST DISCHARGE  Follow up with orthopedics  Follow up with PCP    DISCHARGE DIAGNOSES  Principal Problem:    Left hip fracture (POA: Yes)  Active Problems:    Elevated blood pressure reading (POA: Yes)    Prostatic hyperplasia (POA: Yes)  Resolved Problems:    Hypokalemia (POA: No)      FOLLOW UP  No future appointments.  Brett Melo M.D.  [address] [facility] [address]  [place] [place] [facility] [address]  [phone]            MEDICATIONS ON DISCHARGE     Medication List        START taking these medications        Instructions   aspirin 81 MG EC tablet   Take 1 Tablet by mouth 2 times a day.  Dose: 81  mg     lisinopril 5 MG Tabs  Commonly known as: Prinivil   Take 1 Tablet by mouth every day.  Dose: 5 mg     oxyCODONE immediate-release 5 MG Tabs  Commonly known as: Roxicodone   Take 1 Tablet by mouth every 3 hours as needed for Severe Pain for up to 5 days.  Dose: 5 mg            CONTINUE taking these medications        Instructions   finasteride 5 MG Tabs  Commonly known as: Proscar   Take 5 mg by mouth every day.  Dose: 5 mg              Allergies  No Known Allergies    DIET  Orders Placed This Encounter   Procedures    Diet Order Diet: Regular     Standing Status:   Standing     Number of Occurrences:   1     Order Specific Question:   Diet:     Answer:   Regular [1]       ACTIVITY  As tolerated. With hip precautions  Weight bearing as tolerated    CONSULTATIONS  Orthopedic surgery    PROCEDURES  1/19/2024:       DATE OF OPERATION: 1/19/2024     PREOPERATIVE DIAGNOSIS:  Closed left minimally displaced femoral neck fracture     POSTOPERATIVE DIAGNOSIS: Same     PROCEDURE PERFORMED:   Open reduction internal fixation left femoral neck fracture             LABORATORY  Lab Results   Component Value Date    SODIUM 140 01/20/2024    POTASSIUM 4.0 01/20/2024    CHLORIDE 106 01/20/2024    CO2 24 01/20/2024    GLUCOSE 138 (H) 01/20/2024    BUN 16 01/20/2024    CREATININE 0.88 01/20/2024        Lab Results   Component Value Date    WBC 17.5 (H) 01/20/2024    HEMOGLOBIN 14.5 01/20/2024    HEMATOCRIT 42.6 01/20/2024    PLATELETCT 225 01/20/2024        Total time of the discharge process exceeds 34 minutes.

## 2024-01-20 NOTE — THERAPY
Physical Therapy   Initial Evaluation     Patient Name: Real Yadav  Age:  83 y.o., Sex:  male  Medical Record #: [id number]  Today's Date: 1/20/2024     Precautions  Precautions: (P) Weight Bearing As Tolerated Left Lower Extremity    Assessment  Patient is 83 y.o. male with a diagnosis of L hip Fx with ORIF Pt lives at home with significant other and is active.Pt is safe with bed mob,transfers and ambulation.He understands HEP and needs a FWW for home.      Plan         DC Equipment Recommendations: (P) Front-Wheel Walker  Discharge Recommendations: (P) Recommend outpatient physical therapy services to address higher level deficits       Objective       01/20/24 1000   Charge Group   PT Evaluation PT Evaluation Mod   PT Self Care / Home Evaluation (Units) 1   Initial Contact Note    Initial Contact Note Order Received and Verified, Physical Therapy Evaluation in Progress with Full Report to Follow.   Precautions   Precautions Weight Bearing As Tolerated Left Lower Extremity   Prior Living Situation   Prior Services None   Housing / Facility 1 Story House   Steps Into Home 0   Steps In Home 0   Equipment Owned Crutches   Lives with - Patient's Self Care Capacity Spouse   Prior Level of Functional Mobility   Bed Mobility Independent   Transfer Status Independent   Ambulation Independent   Ambulation Distance community   Assistive Devices Used None   Stairs Independent   Cognition    Cognition / Consciousness WDL   Passive ROM Lower Body   Passive ROM Lower Body X   Active ROM Lower Body    Active ROM Lower Body  X   Strength Lower Body   Lower Body Strength  X   Sensation Lower Body   Lower Extremity Sensation   WDL   Coordination Lower Body    Coordination Lower Body  WDL   Balance Assessment   Sitting Balance (Static) Good   Sitting Balance (Dynamic) Good   Standing Balance (Static) Fair +   Standing Balance (Dynamic) Fair   Weight Shift Sitting Good   Weight Shift Standing Fair   Bed Mobility    Supine to Sit  Minimal Assist   Sit to Supine Minimal Assist   Scooting Modified Independent   Gait Analysis   Gait Level Of Assist Standby Assist   Assistive Device Front Wheel Walker   Distance (Feet) 80   # of Times Distance was Traveled 1   Deviation Antalgic;Step To   # of Stairs Climbed 0   Weight Bearing Status wbat L   Functional Mobility   Sit to Stand Standby Assist   Bed, Chair, Wheelchair Transfer Standby Assist   Toilet Transfers Standby Assist   Transfer Method Stand Step   How much difficulty does the patient currently have...   Turning over in bed (including adjusting bedclothes, sheets and blankets)? 3   Sitting down on and standing up from a chair with arms (e.g., wheelchair, bedside commode, etc.) 4   Moving from lying on back to sitting on the side of the bed? 3   How much help from another person does the patient currently need...   Moving to and from a bed to a chair (including a wheelchair)? 4   Need to walk in a hospital room? 4   Climbing 3-5 steps with a railing? 4   6 clicks Mobility Score 22   Activity Tolerance   Sitting Edge of Bed 10   Standing 10   Patient / Family Goals    Patient / Family Goal #1 home   Anticipated Discharge Equipment and Recommendations   DC Equipment Recommendations Front-Wheel Walker   Discharge Recommendations Recommend outpatient physical therapy services to address higher level deficits   Interdisciplinary Plan of Care Collaboration   IDT Collaboration with  Nursing   Session Information   Date / Session Number  1/20   Priority 0

## 2024-06-20 NOTE — ASSESSMENT & PLAN NOTE
Does not have a diagnosis of primary hypertension    1/20:  May be secondary to pain and intravenous fluids  IV fluid stopped today   No edema,  no murmurs,  regular rate and rhythm. S1 and S2 present and normal

## 2024-07-15 ENCOUNTER — TELEPHONE (OUTPATIENT)
Dept: HEALTH INFORMATION MANAGEMENT | Facility: OTHER | Age: 84
End: 2024-07-15
Payer: MEDICARE

## 2024-08-02 ASSESSMENT — PATIENT HEALTH QUESTIONNAIRE - PHQ9
2. FEELING DOWN, DEPRESSED, IRRITABLE, OR HOPELESS: NOT AT ALL
1. LITTLE INTEREST OR PLEASURE IN DOING THINGS: NOT AT ALL

## 2024-08-02 ASSESSMENT — ACTIVITIES OF DAILY LIVING (ADL): BATHING_REQUIRES_ASSISTANCE: 0

## 2024-08-02 ASSESSMENT — ENCOUNTER SYMPTOMS: GENERAL WELL-BEING: GOOD

## 2024-08-02 NOTE — ASSESSMENT & PLAN NOTE
Chronic, stable. Pt maintains on finasteride 5mg daily with improvement in urinary sx. Follow up with urology per routine for continued monitoring and management.

## 2024-08-06 ENCOUNTER — APPOINTMENT (OUTPATIENT)
Dept: FAMILY PLANNING/WOMEN'S HEALTH CLINIC | Facility: PHYSICIAN GROUP | Age: 84
End: 2024-08-06
Payer: MEDICARE

## 2024-08-06 VITALS
SYSTOLIC BLOOD PRESSURE: 134 MMHG | HEIGHT: 73 IN | WEIGHT: 190 LBS | BODY MASS INDEX: 25.18 KG/M2 | DIASTOLIC BLOOD PRESSURE: 72 MMHG

## 2024-08-06 DIAGNOSIS — N40.0 PROSTATIC HYPERPLASIA: ICD-10-CM

## 2024-08-06 DIAGNOSIS — Z91.81 HISTORY OF FALL: ICD-10-CM

## 2024-08-06 DIAGNOSIS — Z85.828 PERSONAL HISTORY OF SQUAMOUS CELL CARCINOMA OF SKIN: ICD-10-CM

## 2024-08-06 PROCEDURE — 1126F AMNT PAIN NOTED NONE PRSNT: CPT | Performed by: PHYSICIAN ASSISTANT

## 2024-08-06 PROCEDURE — 3078F DIAST BP <80 MM HG: CPT | Performed by: PHYSICIAN ASSISTANT

## 2024-08-06 PROCEDURE — 3075F SYST BP GE 130 - 139MM HG: CPT | Performed by: PHYSICIAN ASSISTANT

## 2024-08-06 PROCEDURE — G0439 PPPS, SUBSEQ VISIT: HCPCS | Performed by: PHYSICIAN ASSISTANT

## 2024-08-06 RX ORDER — INDOMETHACIN 75 MG/1
75 CAPSULE, EXTENDED RELEASE ORAL 2 TIMES DAILY
COMMUNITY
Start: 2010-05-30

## 2024-08-06 SDOH — ECONOMIC STABILITY: TRANSPORTATION INSECURITY
IN THE PAST 12 MONTHS, HAS THE LACK OF TRANSPORTATION KEPT YOU FROM MEDICAL APPOINTMENTS OR FROM GETTING MEDICATIONS?: NO

## 2024-08-06 SDOH — ECONOMIC STABILITY: INCOME INSECURITY: IN THE LAST 12 MONTHS, WAS THERE A TIME WHEN YOU WERE NOT ABLE TO PAY THE MORTGAGE OR RENT ON TIME?: NO

## 2024-08-06 SDOH — ECONOMIC STABILITY: HOUSING INSECURITY
IN THE LAST 12 MONTHS, WAS THERE A TIME WHEN YOU DID NOT HAVE A STEADY PLACE TO SLEEP OR SLEPT IN A SHELTER (INCLUDING NOW)?: NO

## 2024-08-06 SDOH — HEALTH STABILITY: PHYSICAL HEALTH: ON AVERAGE, HOW MANY DAYS PER WEEK DO YOU ENGAGE IN MODERATE TO STRENUOUS EXERCISE (LIKE A BRISK WALK)?: 2 DAYS

## 2024-08-06 SDOH — ECONOMIC STABILITY: FOOD INSECURITY: WITHIN THE PAST 12 MONTHS, YOU WORRIED THAT YOUR FOOD WOULD RUN OUT BEFORE YOU GOT MONEY TO BUY MORE.: NEVER TRUE

## 2024-08-06 SDOH — ECONOMIC STABILITY: TRANSPORTATION INSECURITY
IN THE PAST 12 MONTHS, HAS LACK OF TRANSPORTATION KEPT YOU FROM MEETINGS, WORK, OR FROM GETTING THINGS NEEDED FOR DAILY LIVING?: NO

## 2024-08-06 SDOH — HEALTH STABILITY: PHYSICAL HEALTH: ON AVERAGE, HOW MANY MINUTES DO YOU ENGAGE IN EXERCISE AT THIS LEVEL?: 120 MIN

## 2024-08-06 SDOH — ECONOMIC STABILITY: FOOD INSECURITY: WITHIN THE PAST 12 MONTHS, THE FOOD YOU BOUGHT JUST DIDN'T LAST AND YOU DIDN'T HAVE MONEY TO GET MORE.: NEVER TRUE

## 2024-08-06 SDOH — ECONOMIC STABILITY: INCOME INSECURITY: HOW HARD IS IT FOR YOU TO PAY FOR THE VERY BASICS LIKE FOOD, HOUSING, MEDICAL CARE, AND HEATING?: NOT HARD AT ALL

## 2024-08-06 ASSESSMENT — FIBROSIS 4 INDEX: FIB4 SCORE: 1.31

## 2024-08-06 ASSESSMENT — PATIENT HEALTH QUESTIONNAIRE - PHQ9: CLINICAL INTERPRETATION OF PHQ2 SCORE: 0

## 2024-08-06 ASSESSMENT — PAIN SCALES - GENERAL: PAINLEVEL: NO PAIN

## 2024-08-06 NOTE — ASSESSMENT & PLAN NOTE
Pt suffered a fall while hiking in January 2024. Unfortunately he suffered a left hip fracture, but has since recovered quite well. He graduated from physical therapy and feels stable on his feet. He is dancing 2 hours/day which he feels benefits his balance. Continue to encourage balance exercises, strength exercise and fall risk reduction. Follow up with PCP.

## 2024-08-06 NOTE — ASSESSMENT & PLAN NOTE
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 Follow up with dermatology per routine for continued monitoring and screening.

## 2024-08-06 NOTE — PROGRESS NOTES
Comprehensive Health Assessment Program     KWABENA ROWE is a 84 y.o. here for his comprehensive health assessment.    Patient Active Problem List    Diagnosis Date Noted    Personal history of squamous cell carcinoma of skin 2024    History of fall 2024    Left hip fracture 2024    Prostatic hyperplasia 2024    Elevated blood pressure reading 2024    S/P cataract surgery 2016    Olecranon bursitis 2015    Gout 2014    Prostate cancer screening 2014       Current Outpatient Medications   Medication Sig Dispense Refill    indomethacin SR (INDOCIN SR) 75 MG Cap CR Take 75 mg by mouth 2 times a day.      finasteride (PROSCAR) 5 MG Tab Take 5 mg by mouth every day.       No current facility-administered medications for this visit.          Current supplements as per medication list.     Allergies:   Patient has no known allergies.  Social History     Tobacco Use    Smoking status: Former     Current packs/day: 0.00     Average packs/day: 1.5 packs/day for 40.0 years (60.0 ttl pk-yrs)     Types: Cigarettes     Start date: 1946     Quit date: 1986     Years since quittin.0    Smokeless tobacco: Never   Substance Use Topics    Alcohol use: Yes     Alcohol/week: 5.4 oz     Types: 7 Glasses of wine, 2 Shots of liquor per week    Drug use: No     Family History   Problem Relation Age of Onset    Cancer Mother     Heart Disease Father     Heart Attack Father     Arthritis Brother     Lung Disease Sister     Arthritis Other     Cancer Other     Other Other         GOUT    Heart Disease Darcie YUAN  has a past medical history of Gout, H/O measles, Hemorrhoid, History of chickenpox, and Venereal disease.   Past Surgical History:   Procedure Laterality Date    ORIF, HIP Left 2024    Procedure: ORIF, HIP;  Surgeon: Jaylan Turcios M.D.;  Location: SURGERY [facility];  Service: Orthopedics    VASECTOMY  1973    APPENDECTOMY          Screening:  In the last six months have you experienced any leakage of urine? No    Depression Screening  Little interest or pleasure in doing things?  0 - not at all  Feeling down, depressed , or hopeless? 0 - not at all  Patient Health Questionnaire Score: 0     If depressive symptoms identified deferred to follow up visit unless specifically addressed in assessment and plan.    Interpretation of PHQ-9 Total Score   Score Severity   1-4 No Depression   5-9 Mild Depression   10-14 Moderate Depression   15-19 Moderately Severe Depression   20-27 Severe Depression    Screening for Cognitive Impairment  Do you or any of your friends or family members have any concern about your memory? Yes  Three Minute Recall (Leader, Season, Table) 3/3    Roger clock face with all 12 numbers and set the hands to show 10 minutes after 11.  Yes 5  Cognitive concerns identified deferred for follow up unless specifically addressed in assessment and plan.    Fall Risk Assessment  Has the patient had two or more falls in the last year or any fall with injury in the last year?  Yes    Safety Assessment  Do you always wear your seatbelt?  Yes  Any changes to home needed to function safely? No  Difficulty hearing.  Yes  Patient counseled about all safety risks that were identified.    Functional Assessment ADLs  Are there any barriers preventing you from cooking for yourself or meeting nutritional needs?  No.    Are there any barriers preventing you from driving safely or obtaining transportation?  No.    Are there any barriers preventing you from using a telephone or calling for help?  No    Are there any barriers preventing you from shopping?  No.    Are there any barriers preventing you from taking care of your own finances?  No    Are there any barriers preventing you from managing your medications?  No    Are there any barriers preventing you from showering, bathing or dressing yourself? No    Are there any barriers preventing you  from doing housework or laundry? No  Are there any barriers preventing you from using the toilet?No  Are you currently engaging in any exercise or physical activity?  Yes.      Self-Assessment of Health  What is your perception of your health? Good    Do you sleep more than six hours a night? Yes    In the past 7 days, how much did pain keep you from doing your normal work? None    Do you spend quality time with family or friends (virtually or in person)? Yes    Do you usually eat a heart healthy diet that constists of a variety of fruits, vegetables, whole grains and fiber? Yes    Do you eat foods high in fat and/or Fast Food more than three times per week? No    How concerned are you that your medical conditions are not being well managed? Not at all    Are you worried that in the next 2 months, you may not have stable housing that you own, rent, or stay in as part of a household? No      Advance Care Planning  Do you have an Advance Directive, Living Will, Durable Power of , or POLST? Yes                 Health Maintenance Summary            Overdue - Zoster (Shingles) Vaccines (1 of 2) Never done      No completion history exists for this topic.              Overdue - COVID-19 Vaccine (2 - 2023-24 season) Overdue since 9/1/2023 05/25/2021  Imm Admin: MODERNA SARS-COV-2 VACCINE (12+)              Postponed - IMM DTaP/Tdap/Td Vaccine (1 - Tdap) Postponed until 8/23/2024      No completion history exists for this topic.              Influenza Vaccine (1) Next due on 9/1/2024      No completion history exists for this topic.              Annual Wellness Visit (Yearly) Next due on 8/6/2025 08/06/2024  Level of Service: [place] ANNUAL WELLNESS VISIT-INCLUDES PPPS SUBSEQUE*    07/13/2022  Done    06/16/2021  Visit Dx: Encounter for Medicare annual wellness exam    06/16/2021  Done    12/07/2020  Visit Dx: Encounter for Medicare annual wellness exam    Only the first 5 history entries have been loaded, but  "more history exists.              Pneumococcal Vaccine: 65+ Years (Series Information) Completed      09/21/2023  Imm Admin: Pneumococcal Conjugate Vaccine (PCV20)              Hepatitis A Vaccine (Hep A) (Series Information) Aged Out      No completion history exists for this topic.              HPV Vaccines (Series Information) Aged Out      No completion history exists for this topic.              Polio Vaccine (Inactivated Polio) (Series Information) Aged Out      No completion history exists for this topic.              Meningococcal Immunization (Series Information) Aged Out      No completion history exists for this topic.              Discontinued - Colorectal Cancer Screening  Ordered on 8/23/2023        Frequency changed to Never automatically (Topic No Longer Applies)    09/25/2014  Colonoscopy (Patient Declined)    01/01/1986  Colonoscopy (Done)              Discontinued - Hepatitis B Vaccine (Hep B)  Discontinued      No completion history exists for this topic.                    Patient Care Team:  Brett Melo M.D. as PCP - General (Internal Medicine)      Financial Resource Strain: Low Risk  (8/6/2024)    Overall Financial Resource Strain (CARDIA)     Difficulty of Paying Living Expenses: Not hard at all      Transportation Needs: No Transportation Needs (8/6/2024)    PRAPARE - Transportation     Lack of Transportation (Medical): No     Lack of Transportation (Non-Medical): No      Food Insecurity: No Food Insecurity (8/6/2024)    Hunger Vital Sign     Worried About Running Out of Food in the Last Year: Never true     Ran Out of Food in the Last Year: Never true        Encounter Vitals  Blood Pressure : 134/72  Weight: 86.2 kg (190 lb)  Height: 185.4 cm (6' 1\")  BMI (Calculated): 25.07  Pain Score: No pain     Alert, oriented in no acute distress.  Eye contact is good, speech goal directed, affect calm.    Assessment and Plan. The following treatment and monitoring plan is recommended:  Prostatic " hyperplasia  Chronic, stable. Pt maintains on finasteride 5mg daily with improvement in urinary sx. Follow up with urology per routine for continued monitoring and management.    Personal history of squamous cell carcinoma of skin  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 Follow up with dermatology per routine for continued monitoring and screening.    History of fall  Pt suffered a fall while hiking in January 2024. Unfortunately he suffered a left hip fracture, but has since recovered quite well. He graduated from physical therapy and feels stable on his feet. He is dancing 2 hours/day which he feels benefits his balance. Continue to encourage balance exercises, strength exercise and fall risk reduction. Follow up with PCP.    Services suggested: No services needed at this time  Health Care Screening: Age-appropriate preventive services recommended by USPTF and ACIP covered by Medicare were discussed today. Services ordered if indicated and agreed upon by the patient.  Referrals offered: Community-based lifestyle interventions to reduce health risks and promote self-management and wellness, fall prevention, nutrition, physical activity, tobacco-use cessation, weight loss, and mental health services as per orders if indicated.    Discussion today about general wellness and lifestyle habits:    Prevent falls and reduce trip hazards; Cautioned about securing or removing rugs.  Have a working fire alarm and carbon monoxide detector.  Engage in regular physical activity and social activities.    Follow-up: Return for follow up visit with PCP as previously scheduled.

## 2024-08-11 SDOH — HEALTH STABILITY: PHYSICAL HEALTH

## 2024-08-11 SDOH — ECONOMIC STABILITY: INCOME INSECURITY: IN THE LAST 12 MONTHS, WAS THERE A TIME WHEN YOU WERE NOT ABLE TO PAY THE MORTGAGE OR RENT ON TIME?: NO

## 2024-08-11 SDOH — ECONOMIC STABILITY: TRANSPORTATION INSECURITY

## 2024-08-11 SDOH — ECONOMIC STABILITY: HOUSING INSECURITY: IN THE LAST 12 MONTHS, HOW MANY PLACES HAVE YOU LIVED?: 2

## 2024-08-11 SDOH — HEALTH STABILITY: MENTAL HEALTH

## 2024-08-11 ASSESSMENT — SOCIAL DETERMINANTS OF HEALTH (SDOH)
HOW MANY DRINKS CONTAINING ALCOHOL DO YOU HAVE ON A TYPICAL DAY WHEN YOU ARE DRINKING: 1 OR 2
IN THE PAST 12 MONTHS, HAS THE ELECTRIC, GAS, OIL, OR WATER COMPANY THREATENED TO SHUT OFF SERVICE IN YOUR HOME?: NO
HOW OFTEN DO YOU HAVE SIX OR MORE DRINKS ON ONE OCCASION: NEVER
HOW OFTEN DO YOU HAVE A DRINK CONTAINING ALCOHOL: 2-3 TIMES A WEEK

## 2024-08-11 ASSESSMENT — LIFESTYLE VARIABLES
HOW MANY STANDARD DRINKS CONTAINING ALCOHOL DO YOU HAVE ON A TYPICAL DAY: 1 OR 2
AUDIT-C TOTAL SCORE: 3
HOW OFTEN DO YOU HAVE SIX OR MORE DRINKS ON ONE OCCASION: NEVER
HOW OFTEN DO YOU HAVE A DRINK CONTAINING ALCOHOL: 2-3 TIMES A WEEK
SKIP TO QUESTIONS 9-10: 1

## 2024-08-14 ENCOUNTER — OFFICE VISIT (OUTPATIENT)
Dept: MEDICAL GROUP | Facility: MEDICAL CENTER | Age: 84
End: 2024-08-14
Payer: MEDICARE

## 2024-08-14 VITALS
HEART RATE: 57 BPM | RESPIRATION RATE: 16 BRPM | TEMPERATURE: 97.5 F | SYSTOLIC BLOOD PRESSURE: 138 MMHG | HEIGHT: 73 IN | WEIGHT: 192.2 LBS | OXYGEN SATURATION: 98 % | DIASTOLIC BLOOD PRESSURE: 60 MMHG | BODY MASS INDEX: 25.47 KG/M2

## 2024-08-14 DIAGNOSIS — Z12.5 SCREENING PSA (PROSTATE SPECIFIC ANTIGEN): ICD-10-CM

## 2024-08-14 DIAGNOSIS — Z82.49 FAMILY HISTORY OF HEART DISEASE: ICD-10-CM

## 2024-08-14 DIAGNOSIS — R35.1 BENIGN PROSTATIC HYPERPLASIA WITH NOCTURIA: ICD-10-CM

## 2024-08-14 DIAGNOSIS — M54.32 BILATERAL SCIATICA: ICD-10-CM

## 2024-08-14 DIAGNOSIS — R73.09 ELEVATED GLUCOSE LEVEL: ICD-10-CM

## 2024-08-14 DIAGNOSIS — R03.0 ELEVATED BLOOD PRESSURE READING: ICD-10-CM

## 2024-08-14 DIAGNOSIS — M54.31 BILATERAL SCIATICA: ICD-10-CM

## 2024-08-14 DIAGNOSIS — N40.1 BENIGN PROSTATIC HYPERPLASIA WITH NOCTURIA: ICD-10-CM

## 2024-08-14 DIAGNOSIS — T56.0X1D LEAD-INDUCED CHRONIC GOUT WITHOUT TOPHUS, UNSPECIFIED SITE, SUBSEQUENT ENCOUNTER: ICD-10-CM

## 2024-08-14 DIAGNOSIS — M1A.10X0 LEAD-INDUCED CHRONIC GOUT WITHOUT TOPHUS, UNSPECIFIED SITE, SUBSEQUENT ENCOUNTER: ICD-10-CM

## 2024-08-14 PROBLEM — T14.8XXA FRACTURE: Status: RESOLVED | Noted: 2024-01-18 | Resolved: 2024-08-14

## 2024-08-14 PROCEDURE — 3075F SYST BP GE 130 - 139MM HG: CPT | Performed by: PHYSICIAN ASSISTANT

## 2024-08-14 PROCEDURE — 99214 OFFICE O/P EST MOD 30 MIN: CPT | Performed by: PHYSICIAN ASSISTANT

## 2024-08-14 PROCEDURE — 3078F DIAST BP <80 MM HG: CPT | Performed by: PHYSICIAN ASSISTANT

## 2024-08-14 ASSESSMENT — FIBROSIS 4 INDEX: FIB4 SCORE: 1.31

## 2024-08-14 NOTE — PROGRESS NOTES
Subjective:     History of Present Illness  The patient is an 84-year-old male who presents for evaluation of multiple medical concerns.    He has a history of gout, typically experiencing flare-ups once or twice a year, primarily affecting his toes and occasionally the side of his foot. These episodes are managed with indomethacin, taking two doses initially followed by one daily dose for about five days, which he reports as effective. He has not been prescribed allopurinol previously and has had no recent flare-ups.    He is currently on finasteride 5 mg, as recommended by Dr. Melo, even though he does not have an enlarged prostate. He reports no symptoms of prostate issues, such as increased urinary frequency or weak stream. However, he does wake up twice at night to urinate. He has sufficient medication at present.    His blood pressure was elevated during today's visit, which he attributes to running prior to the appointment.    He has been managing his sciatica with nutritional supplements, which have proven effective. He has not experienced any sciatic pain in the past month. The pain typically affects both legs but is more pronounced on the left side.    He underwent a comprehensive health check-up at the [facility]. His last PSA level was recorded as 3.5. He has an upcoming [facility] in approximately 2 weeks. He is interested in undergoing a CT scan of his heart to assess for plaque. He had a full examination before halfway, which did not reveal any significant findings.    He had cataract surgery in both eyes about 20 years ago. He had olecranon bursitis, which is stable and unchanged. He had hip surgery for a fractured hip in 2024. He was out hiking with a group of 20 people and fell on the sidewalk. He sees a dermatologist on a yearly basis. He is not sure if he has any active cancer. He had some on the side of his neck, which was removed. He will see the dermatologist  "during this week.    FAMILY HISTORY  His father  of heart disease.    IMMUNIZATIONS  He has had 1 shingles vaccine [facility] about 6 months ago.      Current medicines (including changes today)  Current Outpatient Medications   Medication Sig Dispense Refill    indomethacin SR (INDOCIN SR) 75 MG Cap CR Take 75 mg by mouth 2 times a day.      finasteride (PROSCAR) 5 MG Tab Take 5 mg by mouth every day.       No current facility-administered medications for this visit.     He  has a past medical history of Gout, H/O measles, Hemorrhoid, History of chickenpox, and Venereal disease.    ROS   No chest pain, no shortness of breath, no abdominal pain  Positive ROS as per HPI.  All other systems reviewed and are negative.     Objective:     /60 (BP Location: Left arm, Patient Position: Sitting, BP Cuff Size: Adult)   Pulse (!) 57   Temp 36.4 °C (97.5 °F) (Temporal)   Resp 16   Ht 1.854 m (6' 1\")   Wt 87.2 kg (192 lb 3.2 oz)   SpO2 98%  Body mass index is 25.36 kg/m².   Physical Exam  Vital Signs  Blood pressure measures 130 to 140/60.  Constitutional: Alert, no distress.  Skin: Warm, dry, good turgor, no rashes in visible areas.  Eye: Equal, round and reactive, conjunctiva clear, lids normal.  ENMT: Lips without lesions, good dentition, oropharynx clear.  Neck: Trachea midline, no masses, no thyromegaly.   Psych: Alert and oriented x3, normal affect and mood.      Results  Laboratory Studies  PSA was 1.45 a year ago. Glucose level was in the 160s.        Assessment and Plan:   The following treatment plan was discussed    Assessment & Plan  1. Gout.  This is a chronic condition with less than two flares yearly. Continued use of indomethacin was discussed, and it is effective for managing his symptoms. A uric acid level will be checked. If the uric acid level is high, allopurinol may be considered for preventing gout flares.    2. Benign Prostatic Hyperplasia (BPH) with nocturia.  Symptoms appear stable. " He will continue finasteride 5 mg as directed. He will contact through [employer] if a renewal is needed.    3. Elevated blood pressure reading.  Blood pressure was mildly elevated today at 130-140/60. It will continue to be monitored. No new medication will be started at this time.    4. Bilateral sciatica.  This is a chronic condition but currently stable. He is on a regimen of some herbs which seem effective. The status will be checked at follow-up.    5. Elevated glucose level.  Elevated glucose level was noted on recent labs earlier this year, indicating possible prediabetes. An A1c test will be checked to further evaluate his glucose control.    6. Family history of heart disease.  A CT cardiac scoring was ordered to check for plaque in the heart. The cash price of $110 was discussed. He was advised to contact imaging for the test.    7. Health Maintenance.  A PSA screen was ordered as it is due. The last PSA was in the normal range. He was also advised to get the Shingrix vaccine, a two-shot series, as it is more effective than the previous Zostavax vaccine. He will discuss this with his [facility] doctor at his upcoming appointment in 2 weeks.    Follow-up  The patient will follow up in 6 months.      ORDERS:  1. 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 Benign prostatic hyperplasia with nocturia      3. Elevated blood pressure reading      4. Bilateral sciatica      5. Elevated glucose level    - HEMOGLOBIN A1C; Future    6. Family history of heart disease    - CT-CARDIAC SCORING; Future    7. Screening PSA (prostate specific antigen)    - PROSTATE SPECIFIC AG SCREENING; Future    [facility] and B vaccinations and Shingrix vaccination #2.    Please note that this dictation was created using voice recognition software. I have made every reasonable  attempt to correct obvious errors, but I expect that there are errors of grammar and possibly content that I did not discover before finalizing the note.      Attestation      Verbal consent was acquired by the patient to use [employer] ambient listening note generation during this visit Yes

## 2024-09-12 ENCOUNTER — HOSPITAL ENCOUNTER (OUTPATIENT)
Dept: RADIOLOGY | Facility: MEDICAL CENTER | Age: 84
End: 2024-09-12
Attending: PHYSICIAN ASSISTANT
Payer: COMMERCIAL

## 2024-09-12 ENCOUNTER — HOSPITAL ENCOUNTER (OUTPATIENT)
Dept: LAB | Facility: MEDICAL CENTER | Age: 84
End: 2024-09-12
Attending: PHYSICIAN ASSISTANT
Payer: MEDICARE

## 2024-09-12 DIAGNOSIS — R73.09 ELEVATED GLUCOSE LEVEL: ICD-10-CM

## 2024-09-12 DIAGNOSIS — M1A.10X0 LEAD-INDUCED CHRONIC GOUT WITHOUT TOPHUS, UNSPECIFIED SITE, SUBSEQUENT ENCOUNTER: ICD-10-CM

## 2024-09-12 DIAGNOSIS — Z12.5 SCREENING PSA (PROSTATE SPECIFIC ANTIGEN): ICD-10-CM

## 2024-09-12 DIAGNOSIS — T56.0X1D LEAD-INDUCED CHRONIC GOUT WITHOUT TOPHUS, UNSPECIFIED SITE, SUBSEQUENT ENCOUNTER: ICD-10-CM

## 2024-09-12 DIAGNOSIS — Z82.49 FAMILY HISTORY OF HEART DISEASE: ICD-10-CM

## 2024-09-12 LAB
ALBUMIN SERPL BCP-MCNC: 4.2 G/DL (ref 3.2–4.9)
ALBUMIN/GLOB SERPL: 1.6 G/DL
ALP SERPL-CCNC: 178 U/L (ref 30–99)
ALT SERPL-CCNC: 14 U/L (ref 2–50)
ANION GAP SERPL CALC-SCNC: 13 MMOL/L (ref 7–16)
AST SERPL-CCNC: 22 U/L (ref 12–45)
BASOPHILS # BLD AUTO: 0.7 % (ref 0–1.8)
BASOPHILS # BLD: 0.06 K/UL (ref 0–0.12)
BILIRUB SERPL-MCNC: 0.3 MG/DL (ref 0.1–1.5)
BUN SERPL-MCNC: 21 MG/DL (ref 8–22)
CALCIUM ALBUM COR SERPL-MCNC: 10.2 MG/DL (ref 8.5–10.5)
CALCIUM SERPL-MCNC: 10.4 MG/DL (ref 8.4–10.2)
CHLORIDE SERPL-SCNC: 102 MMOL/L (ref 96–112)
CO2 SERPL-SCNC: 26 MMOL/L (ref 20–33)
CREAT SERPL-MCNC: 0.98 MG/DL (ref 0.5–1.4)
EOSINOPHIL # BLD AUTO: 0.21 K/UL (ref 0–0.51)
EOSINOPHIL NFR BLD: 2.5 % (ref 0–6.9)
ERYTHROCYTE [DISTWIDTH] IN BLOOD BY AUTOMATED COUNT: 44.3 FL (ref 35.9–50)
EST. AVERAGE GLUCOSE BLD GHB EST-MCNC: 123 MG/DL
FASTING STATUS PATIENT QL REPORTED: NORMAL
GFR SERPLBLD CREATININE-BSD FMLA CKD-EPI: 77 ML/MIN/1.73 M 2
GLOBULIN SER CALC-MCNC: 2.7 G/DL (ref 1.9–3.5)
GLUCOSE SERPL-MCNC: 101 MG/DL (ref 65–99)
HBA1C MFR BLD: 5.9 % (ref 4–5.6)
HCT VFR BLD AUTO: 46.4 % (ref 42–52)
HGB BLD-MCNC: 16 G/DL (ref 14–18)
IMM GRANULOCYTES # BLD AUTO: 0.01 K/UL (ref 0–0.11)
IMM GRANULOCYTES NFR BLD AUTO: 0.1 % (ref 0–0.9)
LYMPHOCYTES # BLD AUTO: 1.62 K/UL (ref 1–4.8)
LYMPHOCYTES NFR BLD: 19.1 % (ref 22–41)
MCH RBC QN AUTO: 30.5 PG (ref 27–33)
MCHC RBC AUTO-ENTMCNC: 34.5 G/DL (ref 32.3–36.5)
MCV RBC AUTO: 88.5 FL (ref 81.4–97.8)
MONOCYTES # BLD AUTO: 0.57 K/UL (ref 0–0.85)
MONOCYTES NFR BLD AUTO: 6.7 % (ref 0–13.4)
NEUTROPHILS # BLD AUTO: 6.01 K/UL (ref 1.82–7.42)
NEUTROPHILS NFR BLD: 70.9 % (ref 44–72)
NRBC # BLD AUTO: 0 K/UL
NRBC BLD-RTO: 0 /100 WBC (ref 0–0.2)
PLATELET # BLD AUTO: 192 K/UL (ref 164–446)
PMV BLD AUTO: 12.1 FL (ref 9–12.9)
POTASSIUM SERPL-SCNC: 3.8 MMOL/L (ref 3.6–5.5)
PROT SERPL-MCNC: 6.9 G/DL (ref 6–8.2)
PSA SERPL-MCNC: 1.59 NG/ML (ref 0–4)
RBC # BLD AUTO: 5.24 M/UL (ref 4.7–6.1)
SODIUM SERPL-SCNC: 141 MMOL/L (ref 135–145)
URATE SERPL-MCNC: 6.5 MG/DL (ref 2.5–8.3)
WBC # BLD AUTO: 8.5 K/UL (ref 4.8–10.8)

## 2024-09-12 PROCEDURE — 83036 HEMOGLOBIN GLYCOSYLATED A1C: CPT

## 2024-09-12 PROCEDURE — 84550 ASSAY OF BLOOD/URIC ACID: CPT

## 2024-09-12 PROCEDURE — 85025 COMPLETE CBC W/AUTO DIFF WBC: CPT

## 2024-09-12 PROCEDURE — 80053 COMPREHEN METABOLIC PANEL: CPT

## 2024-09-12 PROCEDURE — 4410556 CT-CARDIAC SCORING (SELF PAY ONLY)

## 2024-09-12 PROCEDURE — 36415 COLL VENOUS BLD VENIPUNCTURE: CPT

## 2024-09-12 PROCEDURE — 84153 ASSAY OF PSA TOTAL: CPT

## 2024-09-13 PROBLEM — R73.09 ELEVATED GLUCOSE LEVEL: Status: RESOLVED | Noted: 2024-08-14 | Resolved: 2024-09-13

## 2024-09-13 PROBLEM — R73.03 PREDIABETES: Status: ACTIVE | Noted: 2024-09-13

## 2024-09-13 PROBLEM — R93.1 AGATSTON CORONARY ARTERY CALCIUM SCORE GREATER THAN 400: Status: ACTIVE | Noted: 2024-09-13

## 2024-09-16 ENCOUNTER — TELEPHONE (OUTPATIENT)
Dept: MEDICAL GROUP | Facility: MEDICAL CENTER | Age: 84
End: 2024-09-16
Payer: MEDICARE

## 2024-09-16 NOTE — TELEPHONE ENCOUNTER
Phone Number Called: [phone] (home)       Call outcome: Did not leave a detailed message. Requested patient to call back.    Message: Called patient to ask them to call our office back to discuss message in patient chart

## 2024-12-18 ENCOUNTER — OFFICE VISIT (OUTPATIENT)
Dept: URGENT CARE | Facility: CLINIC | Age: 84
End: 2024-12-18
Payer: MEDICARE

## 2024-12-18 VITALS
DIASTOLIC BLOOD PRESSURE: 72 MMHG | HEIGHT: 73 IN | BODY MASS INDEX: 26.24 KG/M2 | WEIGHT: 198 LBS | HEART RATE: 78 BPM | OXYGEN SATURATION: 96 % | SYSTOLIC BLOOD PRESSURE: 136 MMHG | TEMPERATURE: 97.8 F

## 2024-12-18 DIAGNOSIS — H10.32 ACUTE BACTERIAL CONJUNCTIVITIS OF LEFT EYE: ICD-10-CM

## 2024-12-18 PROCEDURE — 3078F DIAST BP <80 MM HG: CPT | Performed by: PHYSICIAN ASSISTANT

## 2024-12-18 PROCEDURE — 99203 OFFICE O/P NEW LOW 30 MIN: CPT | Performed by: PHYSICIAN ASSISTANT

## 2024-12-18 PROCEDURE — 3075F SYST BP GE 130 - 139MM HG: CPT | Performed by: PHYSICIAN ASSISTANT

## 2024-12-18 RX ORDER — OFLOXACIN 3 MG/ML
SOLUTION/ DROPS OPHTHALMIC
Qty: 10 ML | Refills: 0 | Status: SHIPPED | OUTPATIENT
Start: 2024-12-18

## 2024-12-18 ASSESSMENT — ENCOUNTER SYMPTOMS
EYE DISCHARGE: 1
HEADACHES: 0
DIZZINESS: 0
FEVER: 0
CHILLS: 0
EYE REDNESS: 1

## 2024-12-18 ASSESSMENT — FIBROSIS 4 INDEX: FIB4 SCORE: 2.51

## 2024-12-18 NOTE — PROGRESS NOTES
Subjective     Keyon ROWE is a 84 y.o. male who presents with Eye Problem (Eye redness x 1 week ago)    HPI:  KWABENA ROWE is a 84 y.o. male who presents today for evaluation of a possible infection in his left eye.  Started to notice some redness in the left eye and upper eyelid region about 1 week ago.  He says it has been draining thick white/yellow discharge and he is also noticed some continued redness and swelling to the eyelid.  It does itch but he notes that he has issues with chronic dermatitis and is followed by dermatologist for that.  He does not wear contacts.  Says that there is a little bit of blurriness when the discharge builds up but goes back to normal once that is removed.        Review of Systems   Constitutional:  Negative for chills and fever.   Eyes:  Positive for discharge and redness.   Neurological:  Negative for dizziness and headaches.         PMH:  has a past medical history of Gout, H/O measles, Hemorrhoid, History of chickenpox, and Venereal disease.  MEDS:   Current Outpatient Medications:     ofloxacin (OCUFLOX) 0.3 % Solution, Instill 2 drops in both eyes every 2-4 hours for the first 2 days, then instill 1 to 2 drops 4 times daily for an additional 5 days, Disp: 10 mL, Rfl: 0    indomethacin SR (INDOCIN SR) 75 MG Cap CR, Take 75 mg by mouth 2 times a day., Disp: , Rfl:     finasteride (PROSCAR) 5 MG Tab, Take 5 mg by mouth every day., Disp: , Rfl:   ALLERGIES: No Known Allergies  SURGHX:   Past Surgical History:   Procedure Laterality Date    ORIF, HIP Left 01/19/2024    Procedure: ORIF, HIP;  Surgeon: Jaylan Turcios M.D.;  Location: SURGERY [facility];  Service: Orthopedics    VASECTOMY  01/01/1973    APPENDECTOMY  01/01/1961    CATARACT EXTRACTION WITH IOL Bilateral      SOCHX:  reports that he quit smoking about 38 years ago. His smoking use included cigarettes. He started smoking about 78 years ago. He has a 60 pack-year smoking history. He has never used  "smokeless tobacco. He reports current alcohol use. He reports that he does not use drugs.  FH: Family history was reviewed, no pertinent findings to report'    Objective     /72 (BP Location: Left arm, Patient Position: Sitting, BP Cuff Size: Large adult)   Pulse 78   Temp 36.6 °C (97.8 °F) (Temporal)   Ht 1.854 m (6' 1\")   Wt 89.8 kg (198 lb)   SpO2 96%   BMI 26.12 kg/m²      Physical Exam  Constitutional:       General: He is not in acute distress.     Appearance: He is not diaphoretic.   HENT:      Head: Normocephalic and atraumatic.      Right Ear: External ear normal.      Left Ear: External ear normal.   Eyes:      Pupils: Pupils are equal, round, and reactive to light.      Comments: Left eye: There is mild swelling and erythema of the left upper eyelid.  Discharge noted medially that is thick/white and yellow in coloration.  There is some palpebral conjunctival injection of the left eye.  No scleral conjunctival injection.  No tenderness.  Vision is grossly intact.  EOMs normal.   Pulmonary:      Effort: Pulmonary effort is normal. No respiratory distress.   Musculoskeletal:      Cervical back: Normal range of motion.   Skin:     Findings: No rash.   Neurological:      Mental Status: He is alert and oriented to person, place, and time.   Psychiatric:         Mood and Affect: Mood and affect normal.         Cognition and Memory: Memory normal.         Judgment: Judgment normal.           Assessment & Plan     1. Acute bacterial conjunctivitis of left eye  - ofloxacin (OCUFLOX) 0.3 % Solution; Instill 2 drops in both eyes every 2-4 hours for the first 2 days, then instill 1 to 2 drops 4 times daily for an additional 5 days  Dispense: 10 mL; Refill: 0  Discussed with patient that given the inflammation and redness of the upper eyelid that symptoms may have started out as a stye.  No obvious palpable lump in the eyelid.  No tenderness.  There is some purulent discharge and palpebral conjunctival " injection.  Will treat for bacterial conjunctivitis.  Importance of good hand hygiene discussed. Recommend that patient change pillowcases daily. Do not reuse any washcloths or towels that are used to wash or dry your face. May apply moist warm compresses to help remove discharge.          Differential Diagnosis, natural history, and supportive care discussed. Return to the Urgent Care or follow up with your PCP if symptoms fail to resolve, or for any new or worsening symptoms. Emergency room precautions discussed. Patient and/or family appears understanding of information.

## 2025-02-11 ENCOUNTER — OFFICE VISIT (OUTPATIENT)
Dept: MEDICAL GROUP | Facility: MEDICAL CENTER | Age: 85
End: 2025-02-11
Payer: MEDICARE

## 2025-02-11 VITALS
HEIGHT: 73 IN | WEIGHT: 197.6 LBS | OXYGEN SATURATION: 96 % | DIASTOLIC BLOOD PRESSURE: 64 MMHG | SYSTOLIC BLOOD PRESSURE: 130 MMHG | HEART RATE: 65 BPM | BODY MASS INDEX: 26.19 KG/M2 | TEMPERATURE: 97.4 F

## 2025-02-11 DIAGNOSIS — N40.1 BENIGN PROSTATIC HYPERPLASIA WITH NOCTURIA: ICD-10-CM

## 2025-02-11 DIAGNOSIS — R97.20 ELEVATED PSA, LESS THAN 10 NG/ML: ICD-10-CM

## 2025-02-11 DIAGNOSIS — R35.1 BENIGN PROSTATIC HYPERPLASIA WITH NOCTURIA: ICD-10-CM

## 2025-02-11 DIAGNOSIS — R93.1 AGATSTON CORONARY ARTERY CALCIUM SCORE GREATER THAN 400: ICD-10-CM

## 2025-02-11 DIAGNOSIS — R73.03 PREDIABETES: ICD-10-CM

## 2025-02-11 PROCEDURE — 3075F SYST BP GE 130 - 139MM HG: CPT | Performed by: PHYSICIAN ASSISTANT

## 2025-02-11 PROCEDURE — 99214 OFFICE O/P EST MOD 30 MIN: CPT | Performed by: PHYSICIAN ASSISTANT

## 2025-02-11 PROCEDURE — 3078F DIAST BP <80 MM HG: CPT | Performed by: PHYSICIAN ASSISTANT

## 2025-02-11 ASSESSMENT — FIBROSIS 4 INDEX: FIB4 SCORE: 2.51

## 2025-02-11 ASSESSMENT — PATIENT HEALTH QUESTIONNAIRE - PHQ9: CLINICAL INTERPRETATION OF PHQ2 SCORE: 0

## 2025-02-11 NOTE — PROGRESS NOTES
Subjective:     History of Present Illness  The patient is an 84-year-old male who presents for a follow-up visit. He is accompanied by his wife.    He has been on a regimen of finasteride for approximately 9 months, which he reports as the sole medication for his prostate condition. He experiences nocturia, necessitating urination twice during the night. The efficacy of Proscar in managing this symptom remains uncertain to him.    Over the past 6 months, he has experienced intermittent episodes of flushing in his head, occurring approximately 5 times since his last consultation. These episodes have occurred while standing and once while driving. He expresses concern about the possibility of these symptoms being indicative of a minor cardiac event. He has not sought cardiology consultation for these symptoms. His current medication regimen includes daily aspirin.    [facility] He typically does not take influenza vaccine.    He reports that his blood pressure was recorded as 160 during a recent dental visit, but subsequent home readings have shown a decrease in his blood pressure.    IMMUNIZATIONS  [facility] He typically does not take influenza vaccine.      Current medicines (including changes today)  Current Outpatient Medications   Medication Sig Dispense Refill    ASPIRIN 81 PO Take 1 Tablet by mouth every day.      indomethacin SR (INDOCIN SR) 75 MG Cap CR Take 75 mg by mouth 2 times a day.      finasteride (PROSCAR) 5 MG Tab Take 5 mg by mouth every day.       No current facility-administered medications for this visit.     He  has a past medical history of Gout, H/O measles, Hemorrhoid, History of chickenpox, and Venereal disease.    ROS   No chest pain, no shortness of breath, no abdominal pain  Positive ROS as per HPI.  All other systems reviewed and are negative.     Objective:     /64 (BP Location: Left arm, Patient Position:  "Sitting, BP Cuff Size: Adult)   Pulse 65   Temp 36.3 °C (97.4 °F) (Temporal)   Ht 1.854 m (6' 1\")   Wt 89.6 kg (197 lb 9.6 oz)   SpO2 96%  Body mass index is 26.07 kg/m².   Physical Exam  Vital Signs  Blood pressure is 130/64.  Constitutional: Alert, no distress.  Skin: Warm, dry, good turgor, no rashes in visible areas.  Eye: Equal, round and reactive, conjunctiva clear, lids normal.  ENMT: Lips without lesions, good dentition, oropharynx clear.  Neck: Trachea midline, no masses, no thyromegaly.   Psych: Alert and oriented x3, normal affect and mood.      Results  Laboratory Studies  PSA improved from 5.84 to 1.59. A1c was 5.9. Liver and kidney function were normal.    Imaging  CT cardiogram showed minimal plaque with a score of 495.        Assessment and Plan:   The following treatment plan was discussed    Assessment & Plan  1. Benign prostatic hyperplasia.  His Prostate-Specific Antigen (PSA) levels have shown improvement, returning to the normal range from 5.84 to 1.59. He has been on finasteride for approximately 9 months, which may be contributing to the stabilization of his prostate condition. He reports nocturia about twice per night, which has remained stable. He will continue his current medication regimen with finasteride.    2. Prediabetes.  His Hemoglobin A1c level is 5.9, indicating a prediabetic state. This condition has been stable for a while. No changes to his current management plan are necessary at this time.    3. Cholesterol plaque buildup.  His CT cardiogram revealed minimal plaque with a score of 495, which is above the normal range for his age and sex. He has experienced occasional flushing sensations in his head over the past 6 months, but these symptoms do not appear to be cardiac-related. A referral to cardiology will be made for further evaluation. He is currently taking aspirin once a day, which should be continued. If he does not receive a call from the cardiology department " within 10 business days, he is advised to contact our office via the [phone] line and speak to our referral department.    4. Health maintenance.  [facility] He typically does not take influenza vaccine.  Updated vaccination status.    5. Elevated blood pressure.  His blood pressure readings have been within the normal range, with a recent reading of 130/64.      ORDERS:  1. Elevated PSA, less than 10 ng/ml      2. Benign prostatic hyperplasia with nocturia      3. Prediabetes      4. 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 I have made every reasonable attempt to correct obvious errors, but I expect that there are errors of grammar and possibly content that I did not discover before finalizing the note.      Attestation      Verbal consent was acquired by the patient to use [email or web address] ambient listening note generation during this visit Yes

## 2025-02-17 ENCOUNTER — TELEPHONE (OUTPATIENT)
Dept: HEALTH INFORMATION MANAGEMENT | Facility: OTHER | Age: 85
End: 2025-02-17
Payer: MEDICARE

## 2025-03-24 ENCOUNTER — PATIENT MESSAGE (OUTPATIENT)
Dept: HEALTH INFORMATION MANAGEMENT | Facility: OTHER | Age: 85
End: 2025-03-24

## 2025-03-24 ENCOUNTER — TELEPHONE (OUTPATIENT)
Dept: HEALTH INFORMATION MANAGEMENT | Facility: OTHER | Age: 85
End: 2025-03-24
Payer: MEDICARE

## 2025-04-02 ASSESSMENT — ACTIVITIES OF DAILY LIVING (ADL): BATHING_REQUIRES_ASSISTANCE: 0

## 2025-04-02 ASSESSMENT — ENCOUNTER SYMPTOMS: GENERAL WELL-BEING: EXCELLENT

## 2025-04-02 NOTE — ASSESSMENT & PLAN NOTE
He fell in 2024 and broke his hip. He underwent surgery and has recovered well. Otherwise, no recent falls, no balance problems.

## 2025-04-02 NOTE — ASSESSMENT & PLAN NOTE
Chronic, stable. We discussed his dietary/lifestyle regimen. Follow up with PCP for continued monitoring.  Lab Results   Component Value Date/Time    HBA1C 5.9 (H) 09/12/2024 1541    AVGLUC 123 09/12/2024 1541

## 2025-04-02 NOTE — ASSESSMENT & PLAN NOTE
Chronic, CT cardiac score from 2024 was 495. This indicates a significant amount of cholesterol buildup, although lipids well controlled without use of statins. He does take 81 mg ASA. Follow up with PCP at least annually for continued monitoring and management.    Lab Results   Component Value Date/Time    CHOLSTRLTOT 104 (L) 12/08/2022 12:18 PM    TRIGLYCERIDE 101 12/08/2022 12:18 PM    HDL 40.0 12/08/2022 12:18 PM    LDL 44 12/08/2022 12:18 PM

## 2025-04-02 NOTE — ASSESSMENT & PLAN NOTE
Chronic, stable. He experiences ______. He is currently taking finasteride 5 mg daily daily. States this has greatly improved his symptoms. Follow up with urology/PCP for further monitoring.

## 2025-04-03 ENCOUNTER — APPOINTMENT (OUTPATIENT)
Dept: FAMILY PLANNING/WOMEN'S HEALTH CLINIC | Facility: PHYSICIAN GROUP | Age: 85
End: 2025-04-03
Payer: MEDICARE

## 2025-04-03 VITALS
SYSTOLIC BLOOD PRESSURE: 132 MMHG | DIASTOLIC BLOOD PRESSURE: 62 MMHG | HEIGHT: 73 IN | BODY MASS INDEX: 26.24 KG/M2 | WEIGHT: 198 LBS | HEART RATE: 64 BPM | OXYGEN SATURATION: 95 %

## 2025-04-03 DIAGNOSIS — R15.1 FECAL SMEARING: ICD-10-CM

## 2025-04-03 DIAGNOSIS — R93.1 AGATSTON CORONARY ARTERY CALCIUM SCORE GREATER THAN 400: ICD-10-CM

## 2025-04-03 DIAGNOSIS — R73.03 PREDIABETES: ICD-10-CM

## 2025-04-03 DIAGNOSIS — Z91.81 HISTORY OF FALL: ICD-10-CM

## 2025-04-03 PROCEDURE — 3075F SYST BP GE 130 - 139MM HG: CPT

## 2025-04-03 PROCEDURE — G0439 PPPS, SUBSEQ VISIT: HCPCS

## 2025-04-03 PROCEDURE — 3078F DIAST BP <80 MM HG: CPT

## 2025-04-03 PROCEDURE — 1126F AMNT PAIN NOTED NONE PRSNT: CPT

## 2025-04-03 SDOH — ECONOMIC STABILITY: FOOD INSECURITY: WITHIN THE PAST 12 MONTHS, YOU WORRIED THAT YOUR FOOD WOULD RUN OUT BEFORE YOU GOT THE MONEY TO BUY MORE.: NEVER TRUE

## 2025-04-03 SDOH — ECONOMIC STABILITY: FOOD INSECURITY: HOW HARD IS IT FOR YOU TO PAY FOR THE VERY BASICS LIKE FOOD, HOUSING, MEDICAL CARE, AND HEATING?: NOT HARD AT ALL

## 2025-04-03 SDOH — ECONOMIC STABILITY: HOUSING INSECURITY: IN THE LAST 12 MONTHS, WAS THERE A TIME WHEN YOU WERE NOT ABLE TO PAY THE MORTGAGE OR RENT ON TIME?: NO

## 2025-04-03 SDOH — ECONOMIC STABILITY: FOOD INSECURITY: WITHIN THE PAST 12 MONTHS, THE FOOD YOU BOUGHT JUST DIDN'T LAST AND YOU DIDN'T HAVE MONEY TO GET MORE.: NEVER TRUE

## 2025-04-03 SDOH — ECONOMIC STABILITY: HOUSING INSECURITY: AT ANY TIME IN THE PAST 12 MONTHS, WERE YOU HOMELESS OR LIVING IN A SHELTER (INCLUDING NOW)?: NO

## 2025-04-03 SDOH — ECONOMIC STABILITY: HOUSING INSECURITY: IN THE PAST 12 MONTHS, HOW MANY TIMES HAVE YOU MOVED WHERE YOU WERE LIVING?: 1

## 2025-04-03 SDOH — ECONOMIC STABILITY: TRANSPORTATION INSECURITY: IN THE PAST 12 MONTHS, HAS LACK OF TRANSPORTATION KEPT YOU FROM MEDICAL APPOINTMENTS OR FROM GETTING MEDICATIONS?: NO

## 2025-04-03 ASSESSMENT — ACTIVITIES OF DAILY LIVING (ADL): LACK_OF_TRANSPORTATION: NO

## 2025-04-03 ASSESSMENT — PATIENT HEALTH QUESTIONNAIRE - PHQ9: CLINICAL INTERPRETATION OF PHQ2 SCORE: 0

## 2025-04-03 ASSESSMENT — FIBROSIS 4 INDEX: FIB4 SCORE: 2.51

## 2025-04-03 ASSESSMENT — PAIN SCALES - GENERAL: PAINLEVEL_OUTOF10: NO PAIN

## 2025-04-03 NOTE — ASSESSMENT & PLAN NOTE
Chronic problem he has noted on and off over the last year. He reports no issues defecating, but after will notice fecal smearing in his underwear. Discussed most likely etiology being constipation/lack of fiber. Advise he increase fiber intake as well as incorporating kegel/pelvic floor exercises. Follow up with PCP should symptoms worsen.

## 2025-04-03 NOTE — PROGRESS NOTES
Comprehensive Health Assessment Program     Real Yadav is a 84 y.o. here for his comprehensive health assessment.    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      indomethacin SR (INDOCIN SR) 75 MG Cap CR Take 75 mg by mouth 2 times a day.       No current facility-administered medications for this visit.          Current supplements as per medication list.     Allergies:   Patient has no known allergies.  Social History     Tobacco Use    Smoking status: Former     Current packs/day: 0.00     Average packs/day: 1.5 packs/day for 40.0 years (60.0 ttl pk-yrs)     Types: Cigarettes     Start date: 1946     Quit date: 1986     Years since quittin.6    Smokeless tobacco: Never   Vaping Use    Vaping status: Never Used   Substance Use Topics    Alcohol use: Yes     Comment: social    Drug use: Never     Family History   Problem Relation Age of Onset    Cancer Mother     Heart Disease Father     Heart Attack Father     Arthritis Brother     Lung Disease Sister     Arthritis Other     Cancer Other     Other Other         GOUT    Heart Disease Darcie Noble  has a past medical history of Gout, H/O measles, Hemorrhoid, History of chickenpox, and Venereal disease.   Past Surgical History:   Procedure Laterality Date    ORIF, HIP Left 2024    Procedure: ORIF, HIP;  Surgeon: Jaylan Turcios M.D.;  Location: [facility];  Service: Orthopedics    VASECTOMY  01/01/1973    APPENDECTOMY  01/01/1961    CATARACT EXTRACTION WITH IOL Bilateral        Screening:  In the last six months have you experienced any leakage of urine? Yes, more so fecal leakage.    Depression Screening  Little interest or pleasure in doing things?  0 - not at all  Feeling down, depressed , or hopeless? 0 - not at all  Trouble falling or staying asleep, or sleeping too much?     Feeling tired or having little energy?     Poor appetite or overeating?     Feeling bad about yourself - or that you are a failure or have let yourself or your family down?    Trouble concentrating on things, such as reading the newspaper or watching television?    Moving or speaking so slowly that other people could have noticed.  Or the opposite - being so fidgety or restless that you have been moving around a lot more than usual?     Thoughts that you would be better off dead, or of hurting yourself?     Patient Health Questionnaire Score:      If depressive symptoms identified deferred to follow up visit unless specifically addressed in assessment and plan.    Interpretation of PHQ-9 Total Score   Score Severity   1-4 No Depression   5-9 Mild Depression   10-14 Moderate Depression   15-19 Moderately Severe Depression   20-27 Severe Depression    Screening for Cognitive Impairment  Do you or any of your friends or family members have any concern about your memory? Yes he is taking a nutritional supplement for this. He does cognitively stimulating activities.   Three Minute Recall (Village, Kitchen, Baby) 3/3    Roger clock face with all 12 numbers and set the hands to show 10 minutes past 11.  Yes    Cognitive concerns identified deferred for follow up unless specifically addressed in assessment and plan.    Fall Risk Assessment  Has the patient had two or more falls in the last year or any fall with injury in the last year?  No    Safety Assessment  Do you always wear your  seatbelt?  Yes  Any changes to home needed to function safely? No  Difficulty hearing.  Yes wears hearing aids.   Patient counseled about all safety risks that were identified.    Functional Assessment ADLs  Are there any barriers preventing you from cooking for yourself or meeting nutritional needs?  No.    Are there any barriers preventing you from driving safely or obtaining transportation?  No.    Are there any barriers preventing you from using a telephone or calling for help?  No    Are there any barriers preventing you from shopping?  No.    Are there any barriers preventing you from taking care of your own finances?  No    Are there any barriers preventing you from managing your medications?  No    Are there any barriers preventing you from showering, bathing or dressing yourself? No    Are there any barriers preventing you from doing housework or laundry? No    Are there any barriers preventing you from using the toilet?No    Are you currently engaging in any exercise or physical activity?  Yes.  Dancing 3-4x week, walking when weather permits.     Self-Assessment of Health  What is your perception of your health? Excellent    Do you sleep more than six hours a night? Yes    In the past 7 days, how much did pain keep you from doing your normal work? None    Do you spend quality time with family or friends (virtually or in person)? Yes    Do you usually eat a heart healthy diet that constists of a variety of fruits, vegetables, whole grains and fiber? Yes    Do you eat foods high in fat and/or Fast Food more than three times per week? No    How concerned are you that your medical conditions are not being well managed? Not at all    Are you worried that in the next 2 months, you may not have stable housing that you own, rent, or stay in as part of a household? No        Advance Care Planning  Do you have an Advance Directive, Living Will, Durable Power of , or POLST? Yes    Living Will Durable Power  of    is not on file - instructed patient to bring in a copy to scan into their chart      Health Maintenance Summary            Current Care Gaps       IMM DTaP/Tdap/Td Vaccine (1 - Tdap) Never done     No completion history exists for this topic.                      Upcoming       COVID-19 Vaccine (3 - 2024-25 season) Postponed until 9/13/2025 06/22/2021  Imm Admin: MODERNA SARS-COV-2 VACCINE (12+)    05/25/2021  Imm Admin: MODERNA SARS-COV-2 VACCINE (12+)              Annual Wellness Visit (Yearly) Next due on 4/3/2026      04/03/2025  Level of Service: [place] ANNUAL WELLNESS VISIT-INCLUDES PPPS SUBSEQUE*    08/06/2024  Level of Service: [place] ANNUAL WELLNESS VISIT-INCLUDES PPPS SUBSEQUE*    07/13/2022  Done    06/16/2021  Done    06/16/2021  Visit Dx: Encounter for Medicare annual wellness exam     Only the first 5 history entries have been loaded, but more history exists.                    Completed or No Longer Recommended       Pneumococcal Vaccine: 50+ Years (Series Information) Completed      09/21/2023  Imm Admin: Pneumococcal Conjugate Vaccine (PCV20)              Hepatitis A Vaccine (Hep A) (Series Information) Aged Out      No completion history exists for this topic.              HPV Vaccines (Series Information) Aged Out     No completion history exists for this topic.              Polio Vaccine (Inactivated Polio) (Series Information) Aged Out     No completion history exists for this topic.              Meningococcal Immunization (Series Information) Aged Out     No completion history exists for this topic.              Colorectal Cancer Screening  Discontinued        Frequency changed to Never automatically (Topic No Longer Applies)    08/23/2023  Order placed for Referral to GI for Colonoscopy by Brett Melo M.D.    09/25/2014  Colonoscopy (Patient Declined)    01/01/1986  Colonoscopy (Done)              Hepatitis B Vaccine (Hep B)  Discontinued     No completion history exists for  "this topic.              Influenza Vaccine  Discontinued     No completion history exists for this topic.              Zoster (Shingles) Vaccines  Discontinued     No completion history exists for this topic.                            Patient Care Team:  Farrukh Yoo P.A.-C. as PCP - General (Family Medicine)    Financial Resource Strain: Low Risk  (4/3/2025)    Overall Financial Resource Strain (CARDIA)     Difficulty of Paying Living Expenses: Not hard at all      Transportation Needs: No Transportation Needs (4/3/2025)    PRAPARE - Transportation     Lack of Transportation (Medical): No     Lack of Transportation (Non-Medical): No      Food Insecurity: No Food Insecurity (4/3/2025)    Hunger Vital Sign     Worried About Running Out of Food in the Last Year: Never true     Ran Out of Food in the Last Year: Never true        Encounter Vitals  Blood Pressure : 132/62  Pulse: 64  Pulse Oximetry: 95 %  Weight: 89.8 kg (198 lb)  Height: 185.4 cm (6' 1\")  BMI (Calculated): 26.12  Pain Score: No pain     Physical Exam:  Constitutional: NAD  HENMT: NC/AT, EOMI  Cardiovascular: RRR, No m/r/g  Lungs: CTAB, no w/r/r  Extremities: No c/c/e  Neurologic: Alert & oriented x3, CN II-XII grossly intact    Assessment and Plan. 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 This indicates a significant amount of cholesterol buildup, although lipids well controlled without use of statins. He does take 81 mg ASA. Follow up with PCP at least annually for continued monitoring and management.  Lab Results   Component Value Date/Time    CHOLSTRLTOT 104 (L) 12/08/2022 12:18 PM    TRIGLYCERIDE 101 12/08/2022 12:18 PM    HDL 40.0 12/08/2022 12:18 PM    LDL 44 12/08/2022 12:18 PM     History of fall  He fell in 2024 and broke his hip. He underwent surgery and has recovered well. Otherwise, no recent falls, no balance " problems.      Prediabetes  Chronic, stable. We discussed his dietary/lifestyle regimen. Follow up with PCP for continued monitoring.  Lab Results   Component Value Date/Time    HBA1C 5.9 (H) 09/12/2024 1541    AVGLUC 123 09/12/2024 1541     Fecal smearing  Chronic problem he has noted on and off over the last year. He reports no issues defecating, but after will notice fecal smearing in his underwear. Discussed most likely etiology being constipation/lack of fiber. Advise he increase fiber intake as well as incorporating kegel/pelvic floor exercises. Follow up with PCP should symptoms worsen.     Services suggested: No services needed at this time  Health Care Screening: Age-appropriate preventive services recommended by USPTF and ACIP covered by Medicare were discussed today. Services ordered if indicated and agreed upon by the patient.  Referrals offered: Community-based lifestyle interventions to reduce health risks and promote self-management and wellness, fall prevention, nutrition, physical activity, tobacco-use cessation, weight loss, and mental health services as per orders if indicated.    Discussion today about general wellness and lifestyle habits:    Prevent falls and reduce trip hazards; Cautioned about securing or removing rugs.  Have a working fire alarm and carbon monoxide detector.  Engage in regular physical activity and social activities.    Follow-up: Return for appointment with Primary Care Provider as needed..

## 2025-04-04 ASSESSMENT — ENCOUNTER SYMPTOMS
ORTHOPNEA: 0
VOMITING: 0
DIZZINESS: 0
SYNCOPE: 0
FEVER: 0
PALPITATIONS: 0
WHEEZING: 0
WEAKNESS: 0
NAUSEA: 0
COUGH: 0
DYSPNEA ON EXERTION: 0
IRREGULAR HEARTBEAT: 0
PND: 0
NEAR-SYNCOPE: 0
SHORTNESS OF BREATH: 0
FOCAL WEAKNESS: 0
DIARRHEA: 0
NIGHT SWEATS: 0
ABDOMINAL PAIN: 0

## 2025-04-04 NOTE — PROGRESS NOTES
Cardiology Initial Consultation Note    Date of note:   04/07/25  Primary Care Provider: Farrukh Yoo P.A.-C.  Referring Provider: Farrukh Yoo P.A.-*     Patient Name: Real Yadav   YOB: 1940  MRN:              [id number]    Chief Complaint: Dizziness    History of Present Illness: Mr. Real Yadav is an 84 y.o. male whose current medical problems include elevated coronary calcium score, and prediabetes who is here for cardiac consultation for dizziness.    The patient presents today to discuss symptoms. He reports that he was having some episodes of dizziness in the last few months, and he was referred to cardiology for further workup.  The last episode was about two months ago.  The patient denies any chest pain or shortness of breath on exertion.  The patient dances about 2-3 times a week without any chest pain or shortness of breath.  No orthopnea, PND, or leg swelling.  No palpitations.  No syncope.      Cardiovascular Risk Factors:  1. Smoking status: Former smoker  2. Type II Diabetes Mellitus: Prediabetes, diet controlled  Lab Results   Component Value Date/Time    HBA1C 5.9 (H) 09/12/2024 03:41 PM     3. Hypertension: None  4. Dyslipidemia: Diet controlled  Lab Results   Component Value Date/Time    CHOLSTRLTOT 104 (L) 12/08/2022 1218    TRIGLYCERIDE 101 12/08/2022 1218    HDL 40.0 12/08/2022 1218    LDL 44 12/08/2022 1218    CHOLHDLRAT 2.60 12/08/2022 1218    NONHDL 64 12/08/2022 1218       5. Family history of early Coronary Artery Disease in a first degree relative (Male less than 55 years of age; Female less than 65 years of age): Father with MI  6.  Obesity and/or Metabolic Syndrome: Body mass index is 25.99 kg/m².  7. Sedentary lifestyle: Active, dances 3 times a week    Review of Systems   Constitutional: Negative for fever, malaise/fatigue and night sweats.   Cardiovascular:  Negative for chest pain, dyspnea on exertion, irregular heartbeat, leg swelling,  "near-syncope, orthopnea, palpitations, paroxysmal nocturnal dyspnea and syncope.   Respiratory:  Negative for cough, shortness of breath and wheezing.    Gastrointestinal:  Negative for abdominal pain, diarrhea, nausea and vomiting.   Neurological:  Negative for dizziness, focal weakness and weakness.         All other systems reviewed and are negative.     Current Outpatient Medications   Medication Sig Dispense Refill    ASPIRIN 81 PO Take 1 Tablet by mouth every day.      indomethacin SR (INDOCIN SR) 75 MG Cap CR Take 75 mg by mouth 2 times a day.       No current facility-administered medications for this visit.         No Known Allergies      Physical Exam:  Ambulatory Vitals  /62 (BP Location: Left arm, Patient Position: Sitting, BP Cuff Size: Adult)   Pulse 60   Resp 16   Ht 1.854 m (6' 1\")   Wt 89.4 kg (197 lb)   SpO2 96%    Oxygen Therapy:  Pulse Oximetry: 96 %  BP Readings from Last 4 Encounters:   04/07/25 118/62   04/03/25 132/62   02/11/25 130/64   12/18/24 136/72       Weight/BMI: Body mass index is 25.99 kg/m².  Wt Readings from Last 4 Encounters:   04/07/25 89.4 kg (197 lb)   04/03/25 89.8 kg (198 lb)   02/11/25 89.6 kg (197 lb 9.6 oz)   12/18/24 89.8 kg (198 lb)         General: Well appearing and in no apparent distress  Eyes: nl conjunctiva, no icteric sclera  ENT: normal external appearance of ears, nose, and throat  Neck: no visible JVP,  no carotid bruits  Lungs: normal respiratory effort, CTAB  Heart: RRR, no murmurs, no rubs or gallops,  no edema bilateral lower extremities. No LV/RV heave on cardiac palpatation. + bilateral radial pulses.  + bilateral dp pulses.   Abdomen: soft, non tender, non distended, no masses, normal bowel sounds.  No HSM.  Extremities/MSK: no clubbing, no cyanosis  Neurological: No focal sensory deficits  Psychiatric: Appropriate affect, A/O x 3, intact judgement and insight  Skin: Warm extremities      Lab Data Review:  Lab Results   Component Value " Date/Time    CHOLSTRLTOT 104 (L) 12/08/2022 12:18 PM    LDL 44 12/08/2022 12:18 PM    HDL 40.0 12/08/2022 12:18 PM    TRIGLYCERIDE 101 12/08/2022 12:18 PM       Lab Results   Component Value Date/Time    SODIUM 141 09/12/2024 03:41 PM    POTASSIUM 3.8 09/12/2024 03:41 PM    CHLORIDE 102 09/12/2024 03:41 PM    CO2 26 09/12/2024 03:41 PM    GLUCOSE 101 (H) 09/12/2024 03:41 PM    BUN 21 09/12/2024 03:41 PM    CREATININE 0.98 09/12/2024 03:41 PM     Lab Results   Component Value Date/Time    ALKPHOSPHAT 178 (H) 09/12/2024 03:41 PM    ASTSGOT 22 09/12/2024 03:41 PM    ALTSGPT 14 09/12/2024 03:41 PM    TBILIRUBIN 0.3 09/12/2024 03:41 PM      Lab Results   Component Value Date/Time    WBC 8.5 09/12/2024 03:41 PM    HEMOGLOBIN 16.0 09/12/2024 03:41 PM     Lab Results   Component Value Date/Time    HBA1C 5.9 (H) 09/12/2024 03:41 PM         Cardiac Imaging and Procedures Review:    EKG dated 4/7/2025: My personal interpretation is sinus bradycardia, prolonged WA interval, borderline repolarization abnormality.    CT cardiac scoring 9/13/2024     FINDINGS:     Coronary calcification:  LMA - 44  LCX - 25  LAD - 405.6  RCA - 20.4        Total Calcium Score: 495     Percentile: Calcium score is above the 50th percentile for the patient's age and sex.    Assessment & Plan     1. Elevated coronary artery calcium score  EKG    NM-CARDIAC TREADMILL ONLY-NO IMAGING    EC-ECHOCARDIOGRAM COMPLETE W/O CONT    Holter Monitor Study    Lipid Profile      2. Sinus bradycardia  NM-CARDIAC TREADMILL ONLY-NO IMAGING    EC-ECHOCARDIOGRAM COMPLETE W/O CONT    Holter Monitor Study      3. Dizziness  NM-CARDIAC TREADMILL ONLY-NO IMAGING    EC-ECHOCARDIOGRAM COMPLETE W/O CONT    Holter Monitor Study      4. Abnormal EKG  NM-CARDIAC TREADMILL ONLY-NO IMAGING    EC-ECHOCARDIOGRAM COMPLETE W/O CONT    Holter Monitor Study            Shared Medical Decision Making:    Elevated coronary calcium score  Dizziness  Sinus bradycardia  Abnormal EKG  -Will  repeat lipid panel prior to next visit  -Will plan for treadmill EKG to assess for any ischemia and chronotropic competence  -Will plan for event monitor to assess for any bradycardia arrhythmias or tachyarrhythmias  -Will plan for echocardiogram to assess LVEF and any valvular abnormalities    All of the patient's excellent questions were answered to the best of my knowledge and to his satisfaction.  It was a pleasure seeing Mr. Real Yadav in my clinic today. Return in about 3 months (around 7/7/2025). Patient is aware to call the cardiology clinic with any questions or concerns.      Joi Aggarwal MD  [facility], [address]  [address] [address]  Phone: [phone]  Fax: [phone]

## 2025-04-07 ENCOUNTER — OFFICE VISIT (OUTPATIENT)
Dept: CARDIOLOGY | Facility: MEDICAL CENTER | Age: 85
End: 2025-04-07
Attending: PHYSICIAN ASSISTANT
Payer: MEDICARE

## 2025-04-07 VITALS
OXYGEN SATURATION: 96 % | HEART RATE: 60 BPM | WEIGHT: 197 LBS | DIASTOLIC BLOOD PRESSURE: 62 MMHG | RESPIRATION RATE: 16 BRPM | SYSTOLIC BLOOD PRESSURE: 118 MMHG | HEIGHT: 73 IN | BODY MASS INDEX: 26.11 KG/M2

## 2025-04-07 DIAGNOSIS — R00.1 SINUS BRADYCARDIA: ICD-10-CM

## 2025-04-07 DIAGNOSIS — R93.1 ELEVATED CORONARY ARTERY CALCIUM SCORE: ICD-10-CM

## 2025-04-07 DIAGNOSIS — R42 DIZZINESS: ICD-10-CM

## 2025-04-07 DIAGNOSIS — R94.31 ABNORMAL EKG: ICD-10-CM

## 2025-04-07 LAB — EKG IMPRESSION: NORMAL

## 2025-04-07 PROCEDURE — 93010 ELECTROCARDIOGRAM REPORT: CPT | Performed by: STUDENT IN AN ORGANIZED HEALTH CARE EDUCATION/TRAINING PROGRAM

## 2025-04-07 PROCEDURE — 99204 OFFICE O/P NEW MOD 45 MIN: CPT | Performed by: STUDENT IN AN ORGANIZED HEALTH CARE EDUCATION/TRAINING PROGRAM

## 2025-04-07 PROCEDURE — 99211 OFF/OP EST MAY X REQ PHY/QHP: CPT | Performed by: STUDENT IN AN ORGANIZED HEALTH CARE EDUCATION/TRAINING PROGRAM

## 2025-04-07 PROCEDURE — 93005 ELECTROCARDIOGRAM TRACING: CPT | Mod: TC | Performed by: STUDENT IN AN ORGANIZED HEALTH CARE EDUCATION/TRAINING PROGRAM

## 2025-04-07 ASSESSMENT — FIBROSIS 4 INDEX: FIB4 SCORE: 2.51

## 2025-04-14 ENCOUNTER — NON-PROVIDER VISIT (OUTPATIENT)
Dept: CARDIOLOGY | Facility: MEDICAL CENTER | Age: 85
End: 2025-04-14
Attending: STUDENT IN AN ORGANIZED HEALTH CARE EDUCATION/TRAINING PROGRAM
Payer: MEDICARE

## 2025-04-14 DIAGNOSIS — R00.1 SINUS BRADYCARDIA: ICD-10-CM

## 2025-04-14 DIAGNOSIS — R94.31 ABNORMAL EKG: ICD-10-CM

## 2025-04-14 DIAGNOSIS — R42 DIZZINESS: ICD-10-CM

## 2025-04-14 DIAGNOSIS — R93.1 ELEVATED CORONARY ARTERY CALCIUM SCORE: ICD-10-CM

## 2025-04-14 NOTE — PROGRESS NOTES
Patient enrolled in the 14 day Zio Holter monitor per Joi Aggarwal MD.  Monitor will be shipped to patient via [employer].  Pending EOS.

## 2025-04-17 ENCOUNTER — RESULTS FOLLOW-UP (OUTPATIENT)
Dept: CARDIOLOGY | Facility: MEDICAL CENTER | Age: 85
End: 2025-04-17

## 2025-04-17 ENCOUNTER — HOSPITAL ENCOUNTER (OUTPATIENT)
Facility: MEDICAL CENTER | Age: 85
End: 2025-04-17
Attending: STUDENT IN AN ORGANIZED HEALTH CARE EDUCATION/TRAINING PROGRAM
Payer: MEDICARE

## 2025-04-17 DIAGNOSIS — R93.1 ELEVATED CORONARY ARTERY CALCIUM SCORE: ICD-10-CM

## 2025-04-17 DIAGNOSIS — E78.1 HYPERTRIGLYCERIDEMIA: ICD-10-CM

## 2025-04-17 LAB
CHOLEST SERPL-MCNC: 144 MG/DL (ref 100–199)
HDLC SERPL-MCNC: 21 MG/DL
LDLC SERPL CALC-MCNC: ABNORMAL MG/DL
TRIGL SERPL-MCNC: 648 MG/DL (ref 0–149)

## 2025-04-17 PROCEDURE — 36415 COLL VENOUS BLD VENIPUNCTURE: CPT

## 2025-04-17 PROCEDURE — 80061 LIPID PANEL: CPT

## 2025-04-18 ENCOUNTER — TELEPHONE (OUTPATIENT)
Dept: CARDIOLOGY | Facility: MEDICAL CENTER | Age: 85
End: 2025-04-18
Payer: MEDICARE

## 2025-04-18 NOTE — TELEPHONE ENCOUNTER
HK           Caller: Real Yadav      Topic/issue: Patient was calling about the test results from his recent lipid profile and he was asking for a call back. Please advise        Callback Number: [phone]      Thank you    -Ramon CERDA

## 2025-04-21 RX ORDER — ATORVASTATIN CALCIUM 40 MG/1
40 TABLET, FILM COATED ORAL NIGHTLY
Qty: 100 TABLET | Refills: 3 | Status: SHIPPED | OUTPATIENT
Start: 2025-04-21 | End: 2026-05-26

## 2025-04-21 NOTE — TELEPHONE ENCOUNTER
Received recommendations from  04/18/25. Please see [facility] message to pt 04/21/25 and result notes.

## 2025-04-23 ENCOUNTER — HOSPITAL ENCOUNTER (OUTPATIENT)
Facility: MEDICAL CENTER | Age: 85
End: 2025-04-23
Attending: STUDENT IN AN ORGANIZED HEALTH CARE EDUCATION/TRAINING PROGRAM
Payer: MEDICARE

## 2025-04-23 DIAGNOSIS — R93.1 ELEVATED CORONARY ARTERY CALCIUM SCORE: ICD-10-CM

## 2025-04-23 DIAGNOSIS — E78.1 HYPERTRIGLYCERIDEMIA: ICD-10-CM

## 2025-04-23 LAB
CHOLEST SERPL-MCNC: 123 MG/DL (ref 100–199)
FASTING STATUS PATIENT QL REPORTED: NORMAL
HDLC SERPL-MCNC: 28 MG/DL
LDLC SERPL CALC-MCNC: 56 MG/DL
TRIGL SERPL-MCNC: 196 MG/DL (ref 0–149)

## 2025-04-23 PROCEDURE — 36415 COLL VENOUS BLD VENIPUNCTURE: CPT

## 2025-04-23 PROCEDURE — 80061 LIPID PANEL: CPT

## 2025-04-25 ENCOUNTER — RESULTS FOLLOW-UP (OUTPATIENT)
Dept: CARDIOLOGY | Facility: MEDICAL CENTER | Age: 85
End: 2025-04-25

## 2025-05-08 ENCOUNTER — HOSPITAL ENCOUNTER (OUTPATIENT)
Dept: RADIOLOGY | Facility: MEDICAL CENTER | Age: 85
End: 2025-05-08
Attending: STUDENT IN AN ORGANIZED HEALTH CARE EDUCATION/TRAINING PROGRAM
Payer: MEDICARE

## 2025-05-08 DIAGNOSIS — R00.1 SINUS BRADYCARDIA: ICD-10-CM

## 2025-05-08 DIAGNOSIS — R94.31 ABNORMAL EKG: ICD-10-CM

## 2025-05-08 DIAGNOSIS — R42 DIZZINESS: ICD-10-CM

## 2025-05-08 DIAGNOSIS — R93.1 ELEVATED CORONARY ARTERY CALCIUM SCORE: ICD-10-CM

## 2025-05-08 PROCEDURE — 93017 CV STRESS TEST TRACING ONLY: CPT | Mod: TC

## 2025-05-15 ENCOUNTER — TELEPHONE (OUTPATIENT)
Dept: CARDIOLOGY | Facility: MEDICAL CENTER | Age: 85
End: 2025-05-15
Payer: MEDICARE

## 2025-05-15 ENCOUNTER — RESULTS FOLLOW-UP (OUTPATIENT)
Dept: CARDIOLOGY | Facility: MEDICAL CENTER | Age: 85
End: 2025-05-15

## 2025-07-15 ENCOUNTER — OFFICE VISIT (OUTPATIENT)
Dept: MEDICAL GROUP | Facility: MEDICAL CENTER | Age: 85
End: 2025-07-15
Payer: MEDICARE

## 2025-07-15 VITALS
TEMPERATURE: 97.9 F | WEIGHT: 193.7 LBS | DIASTOLIC BLOOD PRESSURE: 66 MMHG | HEIGHT: 72 IN | SYSTOLIC BLOOD PRESSURE: 134 MMHG | HEART RATE: 58 BPM | OXYGEN SATURATION: 95 % | BODY MASS INDEX: 26.24 KG/M2

## 2025-07-15 DIAGNOSIS — Z12.5 ENCOUNTER FOR SCREENING PROSTATE SPECIFIC ANTIGEN (PSA) MEASUREMENT: ICD-10-CM

## 2025-07-15 DIAGNOSIS — M1A.19X0 LEAD-INDUCED CHRONIC GOUT OF MULTIPLE SITES WITHOUT TOPHUS, SUBSEQUENT ENCOUNTER: ICD-10-CM

## 2025-07-15 DIAGNOSIS — N40.1 BENIGN PROSTATIC HYPERPLASIA WITH NOCTURIA: ICD-10-CM

## 2025-07-15 DIAGNOSIS — T56.0X1D LEAD-INDUCED CHRONIC GOUT OF MULTIPLE SITES WITHOUT TOPHUS, SUBSEQUENT ENCOUNTER: ICD-10-CM

## 2025-07-15 DIAGNOSIS — Z85.820 PERSONAL HISTORY OF MALIGNANT MELANOMA: ICD-10-CM

## 2025-07-15 DIAGNOSIS — M70.21 OLECRANON BURSITIS OF RIGHT ELBOW: ICD-10-CM

## 2025-07-15 DIAGNOSIS — Z91.81 HISTORY OF FALL: ICD-10-CM

## 2025-07-15 DIAGNOSIS — R73.03 PREDIABETES: ICD-10-CM

## 2025-07-15 DIAGNOSIS — Z97.4 WEARS HEARING AID IN BOTH EARS: ICD-10-CM

## 2025-07-15 DIAGNOSIS — R35.1 BENIGN PROSTATIC HYPERPLASIA WITH NOCTURIA: ICD-10-CM

## 2025-07-15 PROBLEM — M54.31 BILATERAL SCIATICA: Status: RESOLVED | Noted: 2024-08-14 | Resolved: 2025-07-15

## 2025-07-15 PROBLEM — M54.32 BILATERAL SCIATICA: Status: RESOLVED | Noted: 2024-08-14 | Resolved: 2025-07-15

## 2025-07-15 PROBLEM — R97.20 ELEVATED PSA, LESS THAN 10 NG/ML: Status: RESOLVED | Noted: 2025-02-11 | Resolved: 2025-07-15

## 2025-07-15 PROCEDURE — 3075F SYST BP GE 130 - 139MM HG: CPT | Performed by: PHYSICIAN ASSISTANT

## 2025-07-15 PROCEDURE — G2211 COMPLEX E/M VISIT ADD ON: HCPCS | Performed by: PHYSICIAN ASSISTANT

## 2025-07-15 PROCEDURE — 3078F DIAST BP <80 MM HG: CPT | Performed by: PHYSICIAN ASSISTANT

## 2025-07-15 PROCEDURE — 99214 OFFICE O/P EST MOD 30 MIN: CPT | Performed by: PHYSICIAN ASSISTANT

## 2025-07-15 RX ORDER — FINASTERIDE 5 MG/1
5 TABLET, FILM COATED ORAL DAILY
COMMUNITY

## 2025-07-15 ASSESSMENT — FIBROSIS 4 INDEX: FIB4 SCORE: 2.54

## 2025-07-15 NOTE — PROGRESS NOTES
Subjective:     History of Present Illness  The patient presents for a follow-up visit.    He reports no new health concerns. He has been managing a heart condition and is scheduled to see Dr. Langley next month. He recently underwent a Holter monitor test and a treadmill stress test, both of which he tolerated well. His current medication regimen includes baby aspirin.    He has been on finasteride 5 mg for his prostate for the past 3 to 4 years, prescribed by his primary care physician in [place]. He typically wakes up once or twice at night. [facility]    He has not experienced any recent gout attacks and is not currently on any medication for this condition. His gout typically affects his big toe and ankle, and he has identified excessive meat and alcohol consumption as triggers.    He continues to have olecranon bursitis in his right elbow.    He regularly sees a dermatologist in [place], where he was recently diagnosed with melanoma for the first time. He has a history of squamous cell carcinoma and is vigilant about skin checks due to his mother's death from melanoma.    He has a history of prediabetes and is due for an A1c test. He recently had his testosterone levels checked and underwent some lab tests ordered by his cardiologist.    He is not currently on any cholesterol medication, as his levels have been normal to low throughout his life.    He reports that his back pain has resolved after taking a natural supplement for 2 months. He has not experienced any recent falls, although he did fall and break his hip about a year and a half ago.    He recently saw Dr. Lofton in [place] for earwax removal. He uses hearing aids in both ears, which are functioning well.    Social History:  Alcohol: The patient consumes alcohol.  Sleep: He typically wakes up once or twice at night.    FAMILY HISTORY  His mother  of melanoma.      Current medicines (including  "changes today)  Current Medications[1]  He  has a past medical history of Gout, H/O measles, Hemorrhoid, History of chickenpox, and Venereal disease.    ROS   No chest pain, no shortness of breath, no abdominal pain  Positive ROS as per HPI.  All other systems reviewed and are negative.     Objective:     /66 (BP Location: Right arm, Patient Position: Sitting, BP Cuff Size: Adult)   Pulse (!) 58   Temp 36.6 °C (97.9 °F) (Temporal)   Ht 1.834 m (6' 0.21\")   Wt 87.9 kg (193 lb 11.2 oz)   SpO2 95%  Body mass index is 26.12 kg/m².   Physical Exam    Extremities: Swelling of the right elbow noted.  Constitutional: Alert, no distress.  Skin: Warm, dry, good turgor, no rashes in visible areas.  Eye: Equal, round and reactive, conjunctiva clear, lids normal.  ENMT: Lips without lesions, good dentition, oropharynx clear.  Neck: Trachea midline, no masses, no thyromegaly.   Psych: Alert and oriented x3, normal affect and mood.      Results          Assessment and Plan:   The following treatment plan was discussed    Assessment & Plan  1.  Agatston coronary artery calcium score greater than 400.  - Currently under the care of Dr. Aggarwal with an appointment scheduled in August.  - Not on any specific medication for his heart condition but takes baby aspirin daily.  - Recently underwent a nuclear medicine stress test, which he tolerated well.  - No cholesterol medication; levels will be discussed with Dr. Aggarwal.    2.  BPH with nocturia.  - On finasteride 5 mg for the past 3-4 years, prescribed by his primary doctor.  - [facility]  - PSA test to be ordered and conducted after 09/18/2025 for insurance coverage.  - Reports getting up once or twice at night, which is manageable.    3. Gout. No recent flares.  - No recent gout flares and not on any medication for it.  - Affects the big toe and ankle; aware that excessive meat and alcohol can trigger it.  - Uric acid levels will not be checked as " they were not significantly elevated during the last assessment.  - No current symptoms or treatment required.    4. Olecranon bursitis.  Chronic condition.  Asymptomatic.  - Continues to have swelling in the right elbow.  - Regularly sees dermatology for skin issues.  - Recently diagnosed with melanoma, a new finding as he usually has squamous cell carcinoma.  - Will continue to monitor skin closely due to family history of melanoma.    5. Prediabetes.  Chronic condition.  - An A1c test will be ordered to monitor prediabetes status.  - No recent labs for this condition; will be checked along with other annual labs.  - Complete blood count, liver, and kidney function tests to be included.  - Labs to be scheduled after 09/18/2025.    6. Back pain.  - Resolved after taking a natural supplement for 2 months.  - No recent falls; last fall was over a year and a half ago, resulting in a hip fracture.  - Back issues to be removed from the problem list.  - No current symptoms or treatment required.    Follow-up  - Follow-up appointment scheduled in 6 months.    () Today's E/M visit is associated with medical care services that serve as the continuing focal point for all needed health care services and/or with medical care services that are part of ongoing care related to a patient's single, serious condition or a complex condition: This includes furnishing services to patients on an ongoing basis that result in care that is personalized to the patient. The services result in a comprehensive, longitudinal, and continuous relationship with the patient and involve delivery of team-based care that is accessible, coordinated with other practitioners and providers, and integrated with the broader health care landscape.    ORDERS:  1. Wears hearing aid in both ears      2. Benign prostatic hyperplasia with nocturia      3. 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 Olecranon  bursitis of right elbow      5. Personal history of malignant melanoma      6. Encounter for screening prostate specific antigen (PSA) measurement    - PROSTATE SPECIFIC AG SCREENING; Future    7. Prediabetes    - CBC WITHOUT DIFFERENTIAL; Future  - HEMOGLOBIN A1C; Future  - Comp Metabolic Panel; Future    8. History of fall          Please note that this dictation was created using voice recognition software. I have made every reasonable attempt to correct obvious errors, but I expect that there are errors of grammar and possibly content that I did not discover before finalizing the note.      Attestation      Verbal consent was acquired by the patient to use [employer] ambient listening note generation during this visit Yes                  [1]   Current Outpatient Medications   Medication Sig Dispense Refill    finasteride (PROSCAR) 5 MG Tab Take 5 mg by mouth every day.      ASPIRIN 81 PO Take 1 Tablet by mouth every day.       No current facility-administered medications for this visit.

## 2025-07-25 NOTE — Clinical Note
Keyon Yadav  [address]  [place] [address]    July 25, 2025    Member Name: Real Yadav   Member Number: [id number]   Reference Number: [id number]   Approved Services: Echos and EKG   Approved Service Dates: 07/25/2025 - 11/22/2025   Requesting Provider: Joi Aggarwal   Requested Provider: [facility]     Dear Real Yadav:    The following medical service(s) requested by Joi Aggarwal have been approved:    Procedure Code Procedure Code Name Requested Quantity Approved Quantity Status   89422 (CPT®) DC ECHO HEART XTHORACIC,COMPLETE W DOPPLER 1 1 Authorized       Approved Quantity means the number of visits approved for medication treatments and/or medical services.    The services should be provided by [facility] no later than 11/22/2025. Please contact the provider listed below with any questions. Your plan benefit may require a deductible, co-payment, or co-insurance for these services.    Provider Information:  [facility]  [phone]    Important Note for Members:    This authorization does not guarantee that [facility] or [facility] will pay for your care. Payment depends on your plan details, if you're eligible for coverage on the day you receive care, and whether the service follows plan rules. These include things such as reviewing if the service is medically necessary. We recommend reviewing your Evidence of Coverage before receiving any care.    Important Note for Providers:    Payment by [facility] or [facility] for these services is subject to the terms of the Evidence of Coverage or Summary Plan Description, eligibility at the time of service, standard processing procedures and confirmation of benefit coverage. All claims are subject to standard processing procedures, including but not limited to coding edits, medical necessity determinations, and other plan policies in effect at the time of claim adjudication.  Providers are required to follow all [facility] Administrative Guidelines and requirements.    For any questions or additional information, please contact Customer Service:    prison [employer] Toll Free: [phone]  TTY users dial: [phone]   Call Center Hours:  Oct 1 - Mar 31, Mon - Fri 7 AM to 8 PM PST  Oct 1 - Mar 31, Sat - Sun 8 AM to 8 PM PST  Apr 1 - Sep 30, Mon - Fri 7 AM to 8 PM PST   Office Hours: Mon - Fri 8 AM to 5 PM [facility]   E-mail: [email or web address]   Website:  [email or web address]      This information is available for free in other languages. Please contact Customer Service at the phone number above for more information. [facility] complies with applicable Federal civil rights laws and does not discriminate on the basis of race, color, national origin, age, disability or sex.    Sincerely,     Healthcare Utilization Management Department     Cc: [facility]   Joi Aggarwal    Multi-Language Insert  Multi- Services  English: We have free  services to answer any questions you may have about our health or drug plan.  To get an , just call us at [phone].  Someone who speaks English/Language can help you.  This is a free service.  Bolivian: Tenemos servicios de intérprete sin costo alguno  para responder cualquier pregunta que pueda tener sobre nuestro plan de lesia o medicamentos. Para hablar con un intérprete, por favor llame al [phone]. Alguien que hable español le podrá ayudar. Tara es un servicio gratuito.  Chinese Mandarin: ?????????????????????????????? ???????????????? [phone]????????????????? ?????????  Chinese Cantonese: ?????????????????????????????? ????????????? [phone]???????????????????? ????????  Tagalog:  Evgeny ortega.  Jame monteiro  kenney longoria franklyn  [phone]. Tong seanniranjanyunior queeniemansoor cayetano gonzaleskevin marion [place].  Mark xiao.  Syrian:  Nous proposons barry services gratuits d'interprétation pour répondre à toutes radha questions relatives à notre régime de santé ou d'assurance-médicaments. Pour accéder au service d'interprétation, il vous suffit de nous appeler au [phone]. Un interlocuteur [facility] pourra vous aider. Ce service est gratuit.  Kazakh:  Uziel tôi có d?ch v? thông d?ch mi?n phí ð? tr? l?i các câu h?i v? chýõng s?c kh?e và chýõng trình thu?c men. N?u quí v? c?n thông d?ch viên jerrell g?i [phone] s? có nhân viên nói ti?ng Vi?t giúp ð? quí v?. Ðây là d?ch v? mi?n phí .  Occitan:  Unser kosCapital Region Medical Centerser DolmetschersKettering Health Main Campusice beantSt. Joseph's Medical Centeret Ihren Fragen zu unserem Gesundheits- und Arzneimittelplan. Unsere Dolmetscher erreichen Sie [phone]. Man wird Ihnen winifred auf [facility]. Dieser Service ist kostenSalt Lake Regional Medical Center.  Lithuanian:  ??? ?? ?? ?? ?? ??? ?? ??? ?? ???? ?? ?? ???? ???? ????. ?? ???? ????? ?? [phone] ??? ??? ????.  ???? ?? ???? ?? ?? ????. ? ???? ??? ?????.   Spanish: Matias galdamezñ âîçíèêíóò âîïðîñû îòíîñèòåëüíî ñòðàõîâîãî èëè ìåäèêàìåíòíîãî ïëàíà, âû ìîæåòå âîñïîëüçîâàòüñÿ íàøèìè áåñïëàòíûìè óñëóãàìè ïåðåâîä÷èêîâ. ×òîáû âîñïîëüçîâàòüñÿ óñëóãàìè ïåðåâîä÷èêà, ïîçâîíèòå íàì ïî òåëåôîíó [phone]. Âàì îêàæåò ïîìîùü ñîòðóäíèê, êîòîðûé ãîâîðèò ïî-póññêè. Äàííàÿ óñëóãà áåñïëàòíàÿ.  Urdu: ÅääÇ äÞÏã ÎÏãÇÊ ÇáãÊÑÌã ÇáÝæÑí ÇáãÌÇäíÉ ááÅÌÇÈÉ Úä Ãí ÃÓÆáÉ ÊÊÚáÞ ÈÇáÕÍÉ Ãæ ÌÏæá ÇáÃÏæíÉ áÏíäÇ. ááÍÕæá Úáì ãÊÑÌã ÝæÑí¡ áíÓ Úáíß Óæì ÇáÇÊÕÇá ÈäÇ Úáì [phone] . ÓíÞæã ÔÎÕ ãÇ íÊÍÏË ÇáÚÑÈíÉ ÈãÓÇÚÏÊß. åÐå ÎÏãÉ ãÌÇäíÉ.  Mary Kay: ????? ????????? ?? ??? ?? ????? ?? ???? ??? ???? ???? ?? ?????? ?? ???? ???? ?? ??? ????? ??? ????? ???????? ?????? ?????? ???. ?? ???????? ??????? ???? ?? ???, ?? ???? [phone] ?? ??? ????. ??? ??????? ?? ?????? ????? ?? ???? ??? ?? ???? ??. ?? ?? ?????  ???? ??.   Romansh:  È disponibile un servizio di interpretariato gratuito per rispondere a eventuali domande sul nostro piano sanitario e farmaceutico. Per un interprete, contattare il bandar [phone]. Un nostro incaricato sudhir parla Italianovi fornirà l'assistenza necessaria. È un servizio gratuito.  Portugués:  Dispomos de serviços de interpretação gratuitos para responder a qualquer questão que tenha acerca do nosso plano de saúde ou de medicação. Para obter um intérprete, contacte-nos através do número [phone]. Irá encontrar alguém que fale o idioma  Português para o ajudar. Tara serviço é gratuito.  Slovak Creole:  Nou genyen sèvis entèprèt gratis janak reponn tout kesyon ou ta genyen konsènan plan medikal oswa dwòg nou an.  Janak jwenn yon eliana jis rele nou nan [phone]. Yon moun ki pale Kreyòl kapab jim w.  Sa a se yon sèvis ki gratis.  Polish:  Umo¿liwiamy bezp³atne skorzystanie z us³ug t³umacza ustnego, który pomo¿e w uzyskaniu odpowiedzi na temat planu zdrowotnego lub dawkowania leków. Antonia skorzystaæ z pomocy t³umacza znaj¹cego hiren royal¿y zadzwoniæ pod numer [phone]. Ta us³uga jest bezp³atna.  Romansh: ????? ??????? ????????????????????? ??????????????????????????????????[phone] ???????????????? ? ????????????????? ?????

## 2025-08-04 ENCOUNTER — TELEPHONE (OUTPATIENT)
Dept: CARDIOLOGY | Facility: MEDICAL CENTER | Age: 85
End: 2025-08-04
Payer: MEDICARE

## 2025-08-05 ENCOUNTER — HOSPITAL ENCOUNTER (OUTPATIENT)
Dept: CARDIOLOGY | Facility: MEDICAL CENTER | Age: 85
End: 2025-08-05
Attending: STUDENT IN AN ORGANIZED HEALTH CARE EDUCATION/TRAINING PROGRAM
Payer: MEDICARE

## 2025-08-05 DIAGNOSIS — R00.1 SINUS BRADYCARDIA: ICD-10-CM

## 2025-08-05 DIAGNOSIS — R94.31 ABNORMAL EKG: ICD-10-CM

## 2025-08-05 DIAGNOSIS — R93.1 ELEVATED CORONARY ARTERY CALCIUM SCORE: ICD-10-CM

## 2025-08-05 DIAGNOSIS — R42 DIZZINESS: ICD-10-CM

## 2025-08-05 PROCEDURE — 93306 TTE W/DOPPLER COMPLETE: CPT

## 2025-08-06 LAB
LV EJECT FRACT MOD 2C 99903: 65.3
LV EJECT FRACT MOD 4C 99902: 69.1
LV EJECT FRACT MOD BP 99901: 66.57

## 2025-08-06 PROCEDURE — 93306 TTE W/DOPPLER COMPLETE: CPT | Mod: 26 | Performed by: STUDENT IN AN ORGANIZED HEALTH CARE EDUCATION/TRAINING PROGRAM

## 2025-08-13 ENCOUNTER — OFFICE VISIT (OUTPATIENT)
Dept: CARDIOLOGY | Facility: MEDICAL CENTER | Age: 85
End: 2025-08-13
Attending: STUDENT IN AN ORGANIZED HEALTH CARE EDUCATION/TRAINING PROGRAM
Payer: MEDICARE

## 2025-08-13 VITALS
WEIGHT: 198 LBS | DIASTOLIC BLOOD PRESSURE: 54 MMHG | HEIGHT: 73 IN | HEART RATE: 66 BPM | SYSTOLIC BLOOD PRESSURE: 122 MMHG | BODY MASS INDEX: 26.24 KG/M2 | OXYGEN SATURATION: 96 % | RESPIRATION RATE: 16 BRPM

## 2025-08-13 DIAGNOSIS — R00.1 SINUS BRADYCARDIA: ICD-10-CM

## 2025-08-13 DIAGNOSIS — R42 DIZZINESS: ICD-10-CM

## 2025-08-13 DIAGNOSIS — R93.1 ELEVATED CORONARY ARTERY CALCIUM SCORE: Primary | ICD-10-CM

## 2025-08-13 PROCEDURE — 3078F DIAST BP <80 MM HG: CPT | Performed by: STUDENT IN AN ORGANIZED HEALTH CARE EDUCATION/TRAINING PROGRAM

## 2025-08-13 PROCEDURE — 99214 OFFICE O/P EST MOD 30 MIN: CPT | Performed by: STUDENT IN AN ORGANIZED HEALTH CARE EDUCATION/TRAINING PROGRAM

## 2025-08-13 PROCEDURE — 99212 OFFICE O/P EST SF 10 MIN: CPT | Performed by: STUDENT IN AN ORGANIZED HEALTH CARE EDUCATION/TRAINING PROGRAM

## 2025-08-13 PROCEDURE — 3074F SYST BP LT 130 MM HG: CPT | Performed by: STUDENT IN AN ORGANIZED HEALTH CARE EDUCATION/TRAINING PROGRAM

## 2025-08-13 ASSESSMENT — ENCOUNTER SYMPTOMS
NAUSEA: 0
DIARRHEA: 0
NEAR-SYNCOPE: 0
ABDOMINAL PAIN: 0
VOMITING: 0
DIZZINESS: 0
SYNCOPE: 0
IRREGULAR HEARTBEAT: 0
FEVER: 0
COUGH: 0
WEAKNESS: 0
ORTHOPNEA: 0
FOCAL WEAKNESS: 0
SHORTNESS OF BREATH: 0
PALPITATIONS: 0
WHEEZING: 0
NIGHT SWEATS: 0
DYSPNEA ON EXERTION: 0
PND: 0

## 2025-08-13 ASSESSMENT — FIBROSIS 4 INDEX: FIB4 SCORE: 2.54

## (undated) DEVICE — CANISTER SUCTION RIGID RED 1500CC (40EA/CA)

## (undated) DEVICE — COVER LIGHT HANDLE FLEXIBLE - SOFT (2EA/PK 80PK/CA)

## (undated) DEVICE — TUBING CLEARLINK DUO-VENT - C-FLO (48EA/CA)

## (undated) DEVICE — PACK MAJOR BASIN - (2EA/CA)

## (undated) DEVICE — STAPLER SKIN DISP - (6/BX 10BX/CA) VISISTAT

## (undated) DEVICE — Device

## (undated) DEVICE — ELECTRODE DUAL RETURN W/ CORD - (50/PK)

## (undated) DEVICE — SPONGE GAUZESTER 4 X 4 4PLY - (128PK/CA)

## (undated) DEVICE — SENSOR OXIMETER ADULT SPO2 RD SET (20EA/BX)

## (undated) DEVICE — SUTURE 1 VICRYL PLUS CTX - 8 X 18 INCH (12/BX)

## (undated) DEVICE — LACTATED RINGERS INJ 1000 ML - (14EA/CA 60CA/PF)

## (undated) DEVICE — SODIUM CHL IRRIGATION 0.9% 1000ML (12EA/CA)

## (undated) DEVICE — TOWEL STOP TIMEOUT SAFETY FLAG (40EA/CA)

## (undated) DEVICE — DRAPE SURGICAL U 77X120 - (10/CA)

## (undated) DEVICE — PAD PREP 24 X 48 CUFFED - (100/CA)

## (undated) DEVICE — SUCTION INSTRUMENT YANKAUER BULBOUS TIP W/O VENT (50EA/CA)

## (undated) DEVICE — HUMID-VENT HEAT AND MOISTURE EXCHANGE- (50/BX)

## (undated) DEVICE — GLOVE BIOGEL SZ 8 SURGICAL PF LTX - (50PR/BX 4BX/CA)

## (undated) DEVICE — SET EXTENSION WITH 2 PORTS (48EA/CA) ***PART #2C8610 IS A SUBSTITUTE*****

## (undated) DEVICE — SUTURE 2-0 VICRYL PLUS CT-1 - 8 X 18 INCH(12/BX)

## (undated) DEVICE — DRESSING XEROFORM 1X8 - (50/BX 4BX/CA)

## (undated) DEVICE — WIRE GUIDE 3.2MM 400MM (1TX10+1TX4+2TX3=20)